# Patient Record
Sex: FEMALE | Race: BLACK OR AFRICAN AMERICAN | Employment: FULL TIME | URBAN - METROPOLITAN AREA
[De-identification: names, ages, dates, MRNs, and addresses within clinical notes are randomized per-mention and may not be internally consistent; named-entity substitution may affect disease eponyms.]

---

## 2017-06-28 ENCOUNTER — OFFICE VISIT (OUTPATIENT)
Dept: OBGYN CLINIC | Facility: CLINIC | Age: 37
End: 2017-06-28

## 2017-06-28 VITALS — WEIGHT: 220 LBS | BODY MASS INDEX: 33 KG/M2 | SYSTOLIC BLOOD PRESSURE: 120 MMHG | DIASTOLIC BLOOD PRESSURE: 68 MMHG

## 2017-06-28 DIAGNOSIS — R35.0 URINARY FREQUENCY: ICD-10-CM

## 2017-06-28 DIAGNOSIS — N89.8 VAGINAL ITCHING: Primary | ICD-10-CM

## 2017-06-28 PROCEDURE — 99213 OFFICE O/P EST LOW 20 MIN: CPT | Performed by: OBSTETRICS & GYNECOLOGY

## 2017-06-28 PROCEDURE — 87086 URINE CULTURE/COLONY COUNT: CPT | Performed by: OBSTETRICS & GYNECOLOGY

## 2017-06-28 PROCEDURE — 87106 FUNGI IDENTIFICATION YEAST: CPT | Performed by: OBSTETRICS & GYNECOLOGY

## 2017-06-28 PROCEDURE — 81001 URINALYSIS AUTO W/SCOPE: CPT | Performed by: OBSTETRICS & GYNECOLOGY

## 2017-06-28 PROCEDURE — 87808 TRICHOMONAS ASSAY W/OPTIC: CPT | Performed by: OBSTETRICS & GYNECOLOGY

## 2017-06-28 PROCEDURE — 87205 SMEAR GRAM STAIN: CPT | Performed by: OBSTETRICS & GYNECOLOGY

## 2017-06-28 PROCEDURE — 81002 URINALYSIS NONAUTO W/O SCOPE: CPT | Performed by: OBSTETRICS & GYNECOLOGY

## 2017-06-28 RX ORDER — SPIRONOLACTONE 25 MG/1
TABLET ORAL
COMMUNITY
Start: 2017-05-30 | End: 2018-05-04

## 2017-06-28 RX ORDER — DOXYCYCLINE 50 MG/1
TABLET ORAL
COMMUNITY
Start: 2017-05-30 | End: 2018-05-04

## 2017-06-28 NOTE — PROGRESS NOTES
Josefina Dickerson is a 42year old female. HPI:   Patient presents with:  Urinary Frequency  Vaginal Problem: Vaginal itching/burning       Started period today with concerns about urinary and vaginal irritative symptoms unrelieved by OTC Monistat.  Office

## 2017-06-29 ENCOUNTER — TELEPHONE (OUTPATIENT)
Dept: OBGYN CLINIC | Facility: CLINIC | Age: 37
End: 2017-06-29

## 2017-06-29 RX ORDER — CLINDAMYCIN HYDROCHLORIDE 300 MG/1
300 CAPSULE ORAL 2 TIMES DAILY
Qty: 14 CAPSULE | Refills: 0 | Status: SHIPPED | OUTPATIENT
Start: 2017-06-29 | End: 2017-07-06

## 2017-06-29 NOTE — TELEPHONE ENCOUNTER
Spoke with pt given results of  + BV on vaginal swab with yeas/ urine culture still pending. Pt aware I will call her once results available. Rx sent over for Clindamycin.

## 2017-06-29 NOTE — TELEPHONE ENCOUNTER
----- Message from Mattie Hernandez MD sent at 6/29/2017 11:31 AM CDT -----  Your lab results are positive for BV, yeast still pending    Please begin eRx for Clindamycin 300 mg twice daily x7d  We will notify you when yeast results return

## 2017-07-31 ENCOUNTER — TELEPHONE (OUTPATIENT)
Dept: OBGYN CLINIC | Facility: CLINIC | Age: 37
End: 2017-07-31

## 2017-07-31 NOTE — TELEPHONE ENCOUNTER
Pt c/o of vaginal infection, discharge, no odor, would like a med called in. Informed pt that we can not call a med in without seeing her first. Scheduled pt with Dr. Krissy Campa 8/1/17.

## 2017-07-31 NOTE — TELEPHONE ENCOUNTER
Patient complaining of vaginal infection with discharge and no odor. Patient would like medication call in.

## 2017-08-01 ENCOUNTER — OFFICE VISIT (OUTPATIENT)
Dept: OBGYN CLINIC | Facility: CLINIC | Age: 37
End: 2017-08-01

## 2017-08-01 VITALS — DIASTOLIC BLOOD PRESSURE: 76 MMHG | SYSTOLIC BLOOD PRESSURE: 114 MMHG

## 2017-08-01 DIAGNOSIS — N76.0 VAGINITIS AND VULVOVAGINITIS: ICD-10-CM

## 2017-08-01 DIAGNOSIS — N89.8 VAGINAL DISCHARGE: Primary | ICD-10-CM

## 2017-08-01 PROCEDURE — 87106 FUNGI IDENTIFICATION YEAST: CPT | Performed by: OBSTETRICS & GYNECOLOGY

## 2017-08-01 PROCEDURE — 87798 DETECT AGENT NOS DNA AMP: CPT | Performed by: OBSTETRICS & GYNECOLOGY

## 2017-08-01 PROCEDURE — 87205 SMEAR GRAM STAIN: CPT | Performed by: OBSTETRICS & GYNECOLOGY

## 2017-08-01 PROCEDURE — 87808 TRICHOMONAS ASSAY W/OPTIC: CPT | Performed by: OBSTETRICS & GYNECOLOGY

## 2017-08-01 PROCEDURE — G0452 MOLECULAR PATHOLOGY INTERPR: HCPCS | Performed by: OBSTETRICS & GYNECOLOGY

## 2017-08-01 PROCEDURE — 99213 OFFICE O/P EST LOW 20 MIN: CPT | Performed by: OBSTETRICS & GYNECOLOGY

## 2017-08-01 RX ORDER — INSULIN ASPART 100 [IU]/ML
INJECTION, SOLUTION INTRAVENOUS; SUBCUTANEOUS
COMMUNITY
Start: 2017-07-13

## 2017-08-01 RX ORDER — METRONIDAZOLE 500 MG/1
500 TABLET ORAL
Qty: 14 TABLET | Refills: 3 | Status: SHIPPED | OUTPATIENT
Start: 2017-08-01 | End: 2017-08-08

## 2017-08-01 RX ORDER — BLOOD SUGAR DIAGNOSTIC
STRIP MISCELLANEOUS
COMMUNITY
Start: 2017-07-19 | End: 2018-05-04

## 2017-08-01 NOTE — PROGRESS NOTES
Dwight Ruffin is for a checkup. She is complaining of foul-smelling vaginal discharge for past 1 week. Patient has had bacterial vaginosis previously. Patient is diabetic and she says that her diabetes is under poor control.   She is planning on seeing endocrin done. HPV screen not performed. Uterus: Retroverted and normal size. Shape: Normal.  No cervical motion tenderness  Adnexa: No masses or tenderness. Cul de sac: Normal.  R/V: Not performed. Small hemorrhoids.        ASSESSMENT/PLAN:           ASSESSMENT

## 2017-08-04 LAB
GENITAL VAGINOSIS SCREEN: NEGATIVE
HSV1 DNA SPEC QL NAA+PROBE: NEGATIVE
HSV2 DNA SPEC QL NAA+PROBE: POSITIVE
TRICHOMONAS SCREEN: NEGATIVE

## 2017-08-07 ENCOUNTER — TELEPHONE (OUTPATIENT)
Dept: OBGYN CLINIC | Facility: CLINIC | Age: 37
End: 2017-08-07

## 2017-08-07 PROBLEM — A60.00 GENITAL HERPES: Status: ACTIVE | Noted: 2017-08-07

## 2018-05-04 ENCOUNTER — OFFICE VISIT (OUTPATIENT)
Dept: OBGYN CLINIC | Facility: CLINIC | Age: 38
End: 2018-05-04

## 2018-05-04 VITALS — HEIGHT: 68 IN | SYSTOLIC BLOOD PRESSURE: 106 MMHG | DIASTOLIC BLOOD PRESSURE: 71 MMHG

## 2018-05-04 DIAGNOSIS — N76.0 VAGINITIS AND VULVOVAGINITIS: Primary | ICD-10-CM

## 2018-05-04 PROCEDURE — 87624 HPV HI-RISK TYP POOLED RSLT: CPT | Performed by: OBSTETRICS & GYNECOLOGY

## 2018-05-04 PROCEDURE — 99213 OFFICE O/P EST LOW 20 MIN: CPT | Performed by: OBSTETRICS & GYNECOLOGY

## 2018-05-04 PROCEDURE — 87205 SMEAR GRAM STAIN: CPT | Performed by: OBSTETRICS & GYNECOLOGY

## 2018-05-04 PROCEDURE — 87086 URINE CULTURE/COLONY COUNT: CPT | Performed by: OBSTETRICS & GYNECOLOGY

## 2018-05-04 PROCEDURE — 81001 URINALYSIS AUTO W/SCOPE: CPT | Performed by: OBSTETRICS & GYNECOLOGY

## 2018-05-04 PROCEDURE — 87106 FUNGI IDENTIFICATION YEAST: CPT | Performed by: OBSTETRICS & GYNECOLOGY

## 2018-05-04 PROCEDURE — 87808 TRICHOMONAS ASSAY W/OPTIC: CPT | Performed by: OBSTETRICS & GYNECOLOGY

## 2018-05-04 RX ORDER — INSULIN ASPART 100 [IU]/ML
INJECTION, SOLUTION INTRAVENOUS; SUBCUTANEOUS
COMMUNITY
Start: 2017-07-13 | End: 2020-01-27

## 2018-05-04 RX ORDER — HYDROXYCHLOROQUINE SULFATE 200 MG/1
200 TABLET, FILM COATED ORAL
COMMUNITY
Start: 2017-12-01 | End: 2018-05-04

## 2018-05-04 RX ORDER — FLUCONAZOLE 150 MG/1
150 TABLET ORAL DAILY
Qty: 3 TABLET | Refills: 4 | Status: SHIPPED | OUTPATIENT
Start: 2018-05-04 | End: 2018-05-07

## 2018-05-04 NOTE — PROGRESS NOTES
dAam Mendosa is here for a checkup. She is complaining of urgency in urination. Also she is complaining of vaginal discharge and itching. Patient has uncontrolled diabetes and she tells me that her sugars can range anywhere from 300-400.   She has seen an endo C&S         ORDERS:     No orders of the defined types were placed in this encounter.       PRESCRIPTIONS:       No prescriptions requested or ordered in this encounter       IMAGING/ REFERRALS:      None     (N76.0) Vaginitis and vulvovaginitis  (primary e

## 2018-05-06 ENCOUNTER — TELEPHONE (OUTPATIENT)
Dept: OBGYN CLINIC | Facility: CLINIC | Age: 38
End: 2018-05-06

## 2018-05-06 RX ORDER — METRONIDAZOLE 500 MG/1
500 TABLET ORAL 2 TIMES DAILY WITH MEALS
Qty: 14 TABLET | Refills: 0 | Status: SHIPPED | OUTPATIENT
Start: 2018-05-06 | End: 2018-05-13

## 2018-10-04 ENCOUNTER — OFFICE VISIT (OUTPATIENT)
Dept: OBGYN CLINIC | Facility: CLINIC | Age: 38
End: 2018-10-04
Payer: COMMERCIAL

## 2018-10-04 VITALS — BODY MASS INDEX: 33 KG/M2 | SYSTOLIC BLOOD PRESSURE: 110 MMHG | HEIGHT: 68 IN | DIASTOLIC BLOOD PRESSURE: 60 MMHG

## 2018-10-04 DIAGNOSIS — N89.8 VAGINAL IRRITATION: Primary | ICD-10-CM

## 2018-10-04 PROBLEM — E11.9 DIABETES (HCC): Status: ACTIVE | Noted: 2018-10-04

## 2018-10-04 PROBLEM — K80.20 CHOLELITHIASIS: Status: ACTIVE | Noted: 2018-10-04

## 2018-10-04 PROBLEM — N12 PYELONEPHRITIS: Status: ACTIVE | Noted: 2018-10-04

## 2018-10-04 PROBLEM — Z30.8 ENCOUNTER FOR POST ESSURE STERILIZATION CHECK: Status: ACTIVE | Noted: 2018-10-04

## 2018-10-04 PROCEDURE — 87086 URINE CULTURE/COLONY COUNT: CPT | Performed by: OBSTETRICS & GYNECOLOGY

## 2018-10-04 PROCEDURE — 87205 SMEAR GRAM STAIN: CPT | Performed by: OBSTETRICS & GYNECOLOGY

## 2018-10-04 PROCEDURE — 99213 OFFICE O/P EST LOW 20 MIN: CPT | Performed by: OBSTETRICS & GYNECOLOGY

## 2018-10-04 PROCEDURE — 81002 URINALYSIS NONAUTO W/O SCOPE: CPT | Performed by: OBSTETRICS & GYNECOLOGY

## 2018-10-04 PROCEDURE — 87106 FUNGI IDENTIFICATION YEAST: CPT | Performed by: OBSTETRICS & GYNECOLOGY

## 2018-10-04 PROCEDURE — 87808 TRICHOMONAS ASSAY W/OPTIC: CPT | Performed by: OBSTETRICS & GYNECOLOGY

## 2018-10-04 RX ORDER — BENZONATATE 200 MG/1
CAPSULE ORAL
Refills: 0 | COMMUNITY
Start: 2018-09-07 | End: 2019-01-09

## 2018-10-04 RX ORDER — ACETAMINOPHEN AND CODEINE PHOSPHATE 300; 30 MG/1; MG/1
1-2 TABLET ORAL
COMMUNITY
Start: 2018-09-07 | End: 2019-01-09

## 2018-10-04 RX ORDER — METRONIDAZOLE 500 MG/1
500 TABLET ORAL
Qty: 14 TABLET | Refills: 1 | Status: SHIPPED | OUTPATIENT
Start: 2018-10-04 | End: 2018-10-11

## 2018-10-04 NOTE — PROGRESS NOTES
Yane Howe is here for a checkup. She is complaining of vaginal discharge and itching. Patient has diabetes that is not under control. She was admitted to [facility] first week of August and she was in the hospital for about 5 days with pyelonephritis. Shape: Normal.  Adnexa: No masses or tenderness. Cul de sac: Normal.  R/V: Not performed. Small hemorrhoids. ASSESSMENT/PLAN:   Assessment     1. Vaginal irritation         ASSESSMENT:      Clinically bacterial vaginosis.   Since patient has uncontr

## 2018-10-07 ENCOUNTER — TELEPHONE (OUTPATIENT)
Dept: OBGYN CLINIC | Facility: CLINIC | Age: 38
End: 2018-10-07

## 2018-10-07 RX ORDER — FLUCONAZOLE 150 MG/1
150 TABLET ORAL DAILY
Qty: 2 TABLET | Refills: 3 | Status: SHIPPED | OUTPATIENT
Start: 2018-10-07 | End: 2018-10-09

## 2018-10-07 NOTE — TELEPHONE ENCOUNTER
I called patient today to let her know that her Candida culture is positive. I want to order Diflucan 150 mg daily for 2 days for her.

## 2019-01-09 ENCOUNTER — OFFICE VISIT (OUTPATIENT)
Dept: OBGYN CLINIC | Facility: CLINIC | Age: 39
End: 2019-01-09
Payer: COMMERCIAL

## 2019-01-09 VITALS — HEIGHT: 68 IN | SYSTOLIC BLOOD PRESSURE: 118 MMHG | DIASTOLIC BLOOD PRESSURE: 68 MMHG | BODY MASS INDEX: 33 KG/M2

## 2019-01-09 DIAGNOSIS — N76.0 VAGINITIS AND VULVOVAGINITIS: Primary | ICD-10-CM

## 2019-01-09 PROCEDURE — 87106 FUNGI IDENTIFICATION YEAST: CPT | Performed by: OBSTETRICS & GYNECOLOGY

## 2019-01-09 PROCEDURE — 87591 N.GONORRHOEAE DNA AMP PROB: CPT | Performed by: OBSTETRICS & GYNECOLOGY

## 2019-01-09 PROCEDURE — 99213 OFFICE O/P EST LOW 20 MIN: CPT | Performed by: OBSTETRICS & GYNECOLOGY

## 2019-01-09 PROCEDURE — 87808 TRICHOMONAS ASSAY W/OPTIC: CPT | Performed by: OBSTETRICS & GYNECOLOGY

## 2019-01-09 PROCEDURE — 87491 CHLMYD TRACH DNA AMP PROBE: CPT | Performed by: OBSTETRICS & GYNECOLOGY

## 2019-01-09 PROCEDURE — 87205 SMEAR GRAM STAIN: CPT | Performed by: OBSTETRICS & GYNECOLOGY

## 2019-01-09 RX ORDER — FLUCONAZOLE 150 MG/1
150 TABLET ORAL DAILY
Qty: 2 TABLET | Refills: 3 | Status: SHIPPED | OUTPATIENT
Start: 2019-01-09 | End: 2019-01-11

## 2019-01-09 NOTE — PROGRESS NOTES
Rosaura Paris is here for a checkup. She is complaining of vulvar itching and vaginal discharge. She has been symptomatic for about 3 days. Patient has history of uncontrolled diabetes. She says her last hemoglobin A1c was 10.   She is currently is seeing en

## 2019-01-10 LAB
C TRACH DNA SPEC QL NAA+PROBE: NEGATIVE
N GONORRHOEA DNA SPEC QL NAA+PROBE: NEGATIVE

## 2019-01-10 RX ORDER — METRONIDAZOLE 500 MG/1
500 TABLET ORAL 2 TIMES DAILY
Qty: 14 TABLET | Refills: 1 | Status: SHIPPED | OUTPATIENT
Start: 2019-01-10 | End: 2020-01-07

## 2019-01-12 LAB
GENITAL VAGINOSIS SCREEN: POSITIVE
TRICHOMONAS SCREEN: NEGATIVE

## 2019-01-14 ENCOUNTER — TELEPHONE (OUTPATIENT)
Dept: OBGYN CLINIC | Facility: CLINIC | Age: 39
End: 2019-01-14

## 2019-01-14 NOTE — TELEPHONE ENCOUNTER
----- Message from Ulysses Potters, MD sent at 1/14/2019  7:43 AM CST -----  Results reviewed. Bacterial vaginosis culture as well as Candida is positive.   Treat bacterial vaginosis with metronidazole 500 mg twice daily for 7 days and Candida with Terazol

## 2019-09-17 NOTE — TELEPHONE ENCOUNTER
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 She feels that the area has healed. She has a h/o HSV as teenager with no recurrences since. Her  also is known to have HSV.  I dw that we will sometimes use daily suppression with medica none

## 2020-01-06 ENCOUNTER — TELEPHONE (OUTPATIENT)
Dept: OBGYN CLINIC | Facility: CLINIC | Age: 40
End: 2020-01-06

## 2020-01-06 NOTE — TELEPHONE ENCOUNTER
Pt c/o vaginal discharge about a week ago. Pt c/o \"horrid smell\", irritation, and thick white discharge. Pt states last week the discharge was more yellow. Tea tree oil suppository thinks it made it worse. [facility] with [facility] 1[facility]9/20109.   [facility]'t h

## 2020-01-07 ENCOUNTER — OFFICE VISIT (OUTPATIENT)
Dept: OBGYN CLINIC | Facility: CLINIC | Age: 40
End: 2020-01-07
Payer: COMMERCIAL

## 2020-01-07 VITALS
SYSTOLIC BLOOD PRESSURE: 110 MMHG | WEIGHT: 235 LBS | HEIGHT: 68 IN | DIASTOLIC BLOOD PRESSURE: 70 MMHG | BODY MASS INDEX: 35.61 KG/M2

## 2020-01-07 DIAGNOSIS — N76.0 RECURRENT VAGINITIS: ICD-10-CM

## 2020-01-07 DIAGNOSIS — N89.8 VAGINAL DISCHARGE: Primary | ICD-10-CM

## 2020-01-07 PROCEDURE — 87808 TRICHOMONAS ASSAY W/OPTIC: CPT | Performed by: OBSTETRICS & GYNECOLOGY

## 2020-01-07 PROCEDURE — 87205 SMEAR GRAM STAIN: CPT | Performed by: OBSTETRICS & GYNECOLOGY

## 2020-01-07 PROCEDURE — 87106 FUNGI IDENTIFICATION YEAST: CPT | Performed by: OBSTETRICS & GYNECOLOGY

## 2020-01-07 PROCEDURE — 99214 OFFICE O/P EST MOD 30 MIN: CPT | Performed by: OBSTETRICS & GYNECOLOGY

## 2020-01-07 RX ORDER — BLOOD-GLUCOSE SENSOR
1 EACH MISCELLANEOUS
COMMUNITY
Start: 2019-11-05

## 2020-01-07 RX ORDER — FLUCONAZOLE 150 MG/1
150 TABLET ORAL WEEKLY
Qty: 26 TABLET | Refills: 0 | Status: SHIPPED | OUTPATIENT
Start: 2020-01-14 | End: 2020-01-27

## 2020-01-07 RX ORDER — HYDROXYCHLOROQUINE SULFATE 200 MG/1
200 TABLET, FILM COATED ORAL
COMMUNITY
Start: 2019-10-09 | End: 2020-10-23

## 2020-01-07 RX ORDER — FLUCONAZOLE 150 MG/1
150 TABLET ORAL
Qty: 3 TABLET | Refills: 0 | Status: SHIPPED | OUTPATIENT
Start: 2020-01-07 | End: 2020-01-14

## 2020-01-07 NOTE — PROGRESS NOTES
[address] [facility]  Obstetrics and Gynecology  Follow Up    Subjective:     Jose Flores is a 37year old  female who presents with c/o recurrent vaginal discharge and yeast infections.  The patient reports in the last year lesions, good perineal support  Urethra: meatus normal   Bladder: well supported  Vagina: erythematous mucosa, no lesions, minimal thin white vaginal discharge without odor, vaginitis swab collected   Uterus: normal size, mobile, nontender  Cervix: normal

## 2020-01-09 LAB
GENITAL VAGINOSIS SCREEN: POSITIVE
TRICHOMONAS SCREEN: NEGATIVE

## 2020-01-09 NOTE — PROGRESS NOTES
Pt notified of lab results and medication. Pt verbalized understanding and agrees with plan of care.

## 2020-01-10 ENCOUNTER — TELEPHONE (OUTPATIENT)
Dept: OBGYN CLINIC | Facility: CLINIC | Age: 40
End: 2020-01-10

## 2020-01-10 RX ORDER — METRONIDAZOLE 7.5 MG/G
1 GEL VAGINAL NIGHTLY
Qty: 1 TUBE | Refills: 0 | Status: SHIPPED | OUTPATIENT
Start: 2020-01-10 | End: 2020-01-27

## 2020-01-10 NOTE — TELEPHONE ENCOUNTER
Per consult with the pharmacy pt picked up her diflucan on 1/7/2020 and her next RX will be available on 1/14/2020 as prescribed. LMTCB for pt.

## 2020-01-10 NOTE — TELEPHONE ENCOUNTER
fluconazole (DIFLUCAN) 150 MG Oral Tab 26 tablet 0 1/14/2020 7/14/2020    Sig - Route: Take 1 tablet (150 mg total) by mouth once a week.  Take 1 tablet by mouth weekly for 6 months. - Oral    Sent to pharmacy as: Fluconazole 150 MG Oral Tablet (DIFLUCA

## 2020-01-23 ENCOUNTER — TELEPHONE (OUTPATIENT)
Dept: OBGYN CLINIC | Facility: CLINIC | Age: 40
End: 2020-01-23

## 2020-01-23 ENCOUNTER — NURSE ONLY (OUTPATIENT)
Dept: OBGYN CLINIC | Facility: CLINIC | Age: 40
End: 2020-01-23
Payer: COMMERCIAL

## 2020-01-23 DIAGNOSIS — R39.9 UTI SYMPTOMS: Primary | ICD-10-CM

## 2020-01-23 LAB
BILIRUBIN: NEGATIVE
GLUCOSE (URINE DIPSTICK): 500 MG/DL
MULTISTIX LOT#: NORMAL NUMERIC
NITRITE, URINE: POSITIVE
PH, URINE: 6.5 (ref 4.5–8)
SPECIFIC GRAVITY: 1.02 (ref 1–1.03)
URINE-COLOR: YELLOW
UROBILINOGEN,SEMI-QN: 0.2 MG/DL (ref 0–1.9)

## 2020-01-23 PROCEDURE — 81002 URINALYSIS NONAUTO W/O SCOPE: CPT | Performed by: OBSTETRICS & GYNECOLOGY

## 2020-01-23 PROCEDURE — 87088 URINE BACTERIA CULTURE: CPT | Performed by: OBSTETRICS & GYNECOLOGY

## 2020-01-23 PROCEDURE — 87086 URINE CULTURE/COLONY COUNT: CPT | Performed by: OBSTETRICS & GYNECOLOGY

## 2020-01-23 PROCEDURE — 87186 SC STD MICRODIL/AGAR DIL: CPT | Performed by: OBSTETRICS & GYNECOLOGY

## 2020-01-23 RX ORDER — SULFAMETHOXAZOLE AND TRIMETHOPRIM 800; 160 MG/1; MG/1
1 TABLET ORAL 2 TIMES DAILY
Qty: 6 TABLET | Refills: 0 | Status: SHIPPED | OUTPATIENT
Start: 2020-01-23 | End: 2020-01-26

## 2020-01-23 NOTE — TELEPHONE ENCOUNTER
Pt c/o urgency, frequency, difficulty holding urine and pain with urination. Pt denies hematuria.     Pt scheduled for RN visit for urine dip today

## 2020-01-23 NOTE — PROGRESS NOTES
Pt here for NV for poss UTI. Pt c/o frequent urination and discomfort. Pt states symptoms of UTI x 3 days. In office UA dip recorded in Epic. Pt NKDA. Per Dr Yahir Love treat pt with Bactrim DS BID x 3days. eRx for Bactrim sent to pharmacy.

## 2020-01-24 NOTE — PROGRESS NOTES
Spoke with pt results of + urine culture given. Pt aware to continue taking Bactrim DS. Pt voiced understanding and agrees with POC.

## 2020-01-27 ENCOUNTER — OFFICE VISIT (OUTPATIENT)
Dept: OBGYN CLINIC | Facility: CLINIC | Age: 40
End: 2020-01-27
Payer: COMMERCIAL

## 2020-01-27 VITALS
HEIGHT: 68 IN | WEIGHT: 236 LBS | DIASTOLIC BLOOD PRESSURE: 60 MMHG | BODY MASS INDEX: 35.77 KG/M2 | SYSTOLIC BLOOD PRESSURE: 114 MMHG

## 2020-01-27 DIAGNOSIS — N76.0 VAGINITIS AND VULVOVAGINITIS: ICD-10-CM

## 2020-01-27 DIAGNOSIS — Z01.419 WOMEN'S ANNUAL ROUTINE GYNECOLOGICAL EXAMINATION: Primary | ICD-10-CM

## 2020-01-27 DIAGNOSIS — L68.0 HIRSUTISM: ICD-10-CM

## 2020-01-27 PROBLEM — E78.5 HYPERLIPIDEMIA: Status: ACTIVE | Noted: 2018-08-23

## 2020-01-27 PROCEDURE — 87205 SMEAR GRAM STAIN: CPT | Performed by: OBSTETRICS & GYNECOLOGY

## 2020-01-27 PROCEDURE — 87591 N.GONORRHOEAE DNA AMP PROB: CPT | Performed by: OBSTETRICS & GYNECOLOGY

## 2020-01-27 PROCEDURE — 87106 FUNGI IDENTIFICATION YEAST: CPT | Performed by: OBSTETRICS & GYNECOLOGY

## 2020-01-27 PROCEDURE — 87624 HPV HI-RISK TYP POOLED RSLT: CPT | Performed by: OBSTETRICS & GYNECOLOGY

## 2020-01-27 PROCEDURE — 87808 TRICHOMONAS ASSAY W/OPTIC: CPT | Performed by: OBSTETRICS & GYNECOLOGY

## 2020-01-27 PROCEDURE — 99396 PREV VISIT EST AGE 40-64: CPT | Performed by: OBSTETRICS & GYNECOLOGY

## 2020-01-27 PROCEDURE — 87491 CHLMYD TRACH DNA AMP PROBE: CPT | Performed by: OBSTETRICS & GYNECOLOGY

## 2020-01-27 RX ORDER — FLUCONAZOLE 150 MG/1
150 TABLET ORAL DAILY
Qty: 3 TABLET | Refills: 3 | Status: SHIPPED | OUTPATIENT
Start: 2020-01-27 | End: 2020-01-30

## 2020-01-27 NOTE — PROGRESS NOTES
Sophie Castro is here for a checkup. Patient is complaining of slight vaginal discharge. Patient was seen previously about 2 3 weeks ago for same complaints. Sophie Castro currently has insulin pump and she tells me that her recent hemoglobin A1c was 11.   She says Application by Does not apply route.      • NOVOLOG 100 UNIT/ML Subcutaneous Solution          Family History     Family History   Problem Relation Age of Onset   • High Blood Pressure Father    • Blood Disorder Mother         ITP   • High Blood Pressure Mo Concern: Not Asked        Stress Concern: Not Asked        Weight Concern: Not Asked        Special Diet: Not Asked        Back Care: Not Asked        Exercise: Not Asked        Bike Helmet: Not Asked        Seat Belt: Not Asked        Self-Exams: Not Aske

## 2020-01-28 LAB
C TRACH DNA SPEC QL NAA+PROBE: NEGATIVE
HPV I/H RISK 1 DNA SPEC QL NAA+PROBE: NEGATIVE
N GONORRHOEA DNA SPEC QL NAA+PROBE: NEGATIVE

## 2020-01-29 LAB
GENITAL VAGINOSIS SCREEN: NEGATIVE
TRICHOMONAS SCREEN: NEGATIVE

## 2020-04-17 ENCOUNTER — TELEPHONE (OUTPATIENT)
Dept: OBGYN CLINIC | Facility: CLINIC | Age: 40
End: 2020-04-17

## 2020-04-17 RX ORDER — SULFAMETHOXAZOLE AND TRIMETHOPRIM 800; 160 MG/1; MG/1
1 TABLET ORAL 2 TIMES DAILY
Qty: 6 TABLET | Refills: 0 | Status: SHIPPED | OUTPATIENT
Start: 2020-04-17 | End: 2020-04-20

## 2020-04-17 NOTE — TELEPHONE ENCOUNTER
Called pt and c/o UTI, same as before, frequent urination, pressure on the bladder denies burning. Provided med for UTI. Pt verbalized understanding.

## 2020-08-20 ENCOUNTER — OFFICE VISIT (OUTPATIENT)
Dept: OBGYN CLINIC | Facility: CLINIC | Age: 40
End: 2020-08-20
Payer: COMMERCIAL

## 2020-08-20 VITALS
WEIGHT: 236 LBS | HEIGHT: 68 IN | SYSTOLIC BLOOD PRESSURE: 126 MMHG | BODY MASS INDEX: 35.77 KG/M2 | DIASTOLIC BLOOD PRESSURE: 82 MMHG

## 2020-08-20 DIAGNOSIS — Z11.3 SCREENING EXAMINATION FOR VENEREAL DISEASE: ICD-10-CM

## 2020-08-20 DIAGNOSIS — N89.8 VAGINAL DISCHARGE: Primary | ICD-10-CM

## 2020-08-20 PROCEDURE — 3074F SYST BP LT 130 MM HG: CPT | Performed by: OBSTETRICS & GYNECOLOGY

## 2020-08-20 PROCEDURE — 87491 CHLMYD TRACH DNA AMP PROBE: CPT | Performed by: OBSTETRICS & GYNECOLOGY

## 2020-08-20 PROCEDURE — 87808 TRICHOMONAS ASSAY W/OPTIC: CPT | Performed by: OBSTETRICS & GYNECOLOGY

## 2020-08-20 PROCEDURE — 3079F DIAST BP 80-89 MM HG: CPT | Performed by: OBSTETRICS & GYNECOLOGY

## 2020-08-20 PROCEDURE — 99213 OFFICE O/P EST LOW 20 MIN: CPT | Performed by: OBSTETRICS & GYNECOLOGY

## 2020-08-20 PROCEDURE — 87106 FUNGI IDENTIFICATION YEAST: CPT | Performed by: OBSTETRICS & GYNECOLOGY

## 2020-08-20 PROCEDURE — 87591 N.GONORRHOEAE DNA AMP PROB: CPT | Performed by: OBSTETRICS & GYNECOLOGY

## 2020-08-20 PROCEDURE — 87205 SMEAR GRAM STAIN: CPT | Performed by: OBSTETRICS & GYNECOLOGY

## 2020-08-20 PROCEDURE — 3008F BODY MASS INDEX DOCD: CPT | Performed by: OBSTETRICS & GYNECOLOGY

## 2020-08-20 RX ORDER — FLUCONAZOLE 150 MG/1
150 TABLET ORAL ONCE
Qty: 2 TABLET | Refills: 0 | Status: SHIPPED | OUTPATIENT
Start: 2020-08-20 | End: 2020-08-20

## 2020-08-20 RX ORDER — METRONIDAZOLE 7.5 MG/G
1 GEL VAGINAL NIGHTLY
Qty: 1 TUBE | Refills: 0 | Status: SHIPPED | OUTPATIENT
Start: 2020-08-20 | End: 2020-08-25

## 2020-08-20 NOTE — PROGRESS NOTES
[address] [facility]  Obstetrics and Gynecology  Follow Up    Subjective:     Alison Henao is a 41year old  female who presents with c/o vaginal discharge for about 1 week. Pt noted she feels like a BV infection.  She noted a like a popped pimple.    EXTREMITIES:  Normal range of motion, strength 5/5, nontender without edema      Assessment:     Liset Carrier is a 41year old  female who presents for vaginal discharge and new skin lesion    Patient Active Problem List:

## 2020-08-21 LAB
C TRACH DNA SPEC QL NAA+PROBE: NEGATIVE
N GONORRHOEA DNA SPEC QL NAA+PROBE: NEGATIVE

## 2020-08-22 LAB
GENITAL VAGINOSIS SCREEN: NEGATIVE
TRICHOMONAS SCREEN: NEGATIVE

## 2022-08-01 ENCOUNTER — HOSPITAL ENCOUNTER (EMERGENCY)
Facility: HOSPITAL | Age: 42
Discharge: HOME | End: 2022-08-01
Payer: COMMERCIAL

## 2022-08-01 VITALS
HEART RATE: 84 BPM | OXYGEN SATURATION: 99 % | DIASTOLIC BLOOD PRESSURE: 74 MMHG | WEIGHT: 240 LBS | BODY MASS INDEX: 37.67 KG/M2 | HEIGHT: 67 IN | TEMPERATURE: 99 F | SYSTOLIC BLOOD PRESSURE: 130 MMHG | RESPIRATION RATE: 16 BRPM

## 2022-08-01 DIAGNOSIS — N61.1 BREAST ABSCESS: Primary | ICD-10-CM

## 2022-08-01 LAB
ANION GAP SERPL CALC-SCNC: 6 MMOL/L (ref 0–18)
BASOPHILS # BLD AUTO: 0.06 X10(3) UL (ref 0–0.2)
BASOPHILS NFR BLD AUTO: 0.4 %
BUN BLD-MCNC: 12 MG/DL (ref 7–18)
BUN/CREAT SERPL: 15.4 (ref 10–20)
CALCIUM BLD-MCNC: 9.2 MG/DL (ref 8.5–10.1)
CHLORIDE SERPL-SCNC: 102 MMOL/L (ref 98–112)
CO2 SERPL-SCNC: 26 MMOL/L (ref 21–32)
CREAT BLD-MCNC: 0.78 MG/DL
DEPRECATED RDW RBC AUTO: 40.2 FL (ref 35.1–46.3)
EOSINOPHIL # BLD AUTO: 0.25 X10(3) UL (ref 0–0.7)
EOSINOPHIL NFR BLD AUTO: 1.9 %
ERYTHROCYTE [DISTWIDTH] IN BLOOD BY AUTOMATED COUNT: 13.1 % (ref 11–15)
GLUCOSE BLD-MCNC: 243 MG/DL (ref 70–99)
HCT VFR BLD AUTO: 38.4 %
HGB BLD-MCNC: 12.6 G/DL
IMM GRANULOCYTES # BLD AUTO: 0.04 X10(3) UL (ref 0–1)
IMM GRANULOCYTES NFR BLD: 0.3 %
LYMPHOCYTES # BLD AUTO: 2.1 X10(3) UL (ref 1–4)
LYMPHOCYTES NFR BLD AUTO: 15.7 %
MCH RBC QN AUTO: 27.7 PG (ref 26–34)
MCHC RBC AUTO-ENTMCNC: 32.8 G/DL (ref 31–37)
MCV RBC AUTO: 84.4 FL
MONOCYTES # BLD AUTO: 1.89 X10(3) UL (ref 0.1–1)
MONOCYTES NFR BLD AUTO: 14.2 %
NEUTROPHILS # BLD AUTO: 9 X10 (3) UL (ref 1.5–7.7)
NEUTROPHILS # BLD AUTO: 9 X10(3) UL (ref 1.5–7.7)
NEUTROPHILS NFR BLD AUTO: 67.5 %
OSMOLALITY SERPL CALC.SUM OF ELEC: 286 MOSM/KG (ref 275–295)
PLATELET # BLD AUTO: 279 10(3)UL (ref 150–450)
POTASSIUM SERPL-SCNC: 4.2 MMOL/L (ref 3.5–5.1)
RBC # BLD AUTO: 4.55 X10(6)UL
SODIUM SERPL-SCNC: 134 MMOL/L (ref 136–145)
WBC # BLD AUTO: 13.3 X10(3) UL (ref 4–11)

## 2022-08-01 PROCEDURE — 99283 EMERGENCY DEPT VISIT LOW MDM: CPT

## 2022-08-01 PROCEDURE — 10160 PNXR ASPIR ABSC HMTMA BULLA: CPT

## 2022-08-01 PROCEDURE — 80048 BASIC METABOLIC PNL TOTAL CA: CPT | Performed by: NURSE PRACTITIONER

## 2022-08-01 PROCEDURE — 87186 SC STD MICRODIL/AGAR DIL: CPT | Performed by: NURSE PRACTITIONER

## 2022-08-01 PROCEDURE — 87205 SMEAR GRAM STAIN: CPT | Performed by: NURSE PRACTITIONER

## 2022-08-01 PROCEDURE — 87077 CULTURE AEROBIC IDENTIFY: CPT | Performed by: NURSE PRACTITIONER

## 2022-08-01 PROCEDURE — 36415 COLL VENOUS BLD VENIPUNCTURE: CPT

## 2022-08-01 PROCEDURE — 85025 COMPLETE CBC W/AUTO DIFF WBC: CPT | Performed by: NURSE PRACTITIONER

## 2022-08-01 PROCEDURE — 87070 CULTURE OTHR SPECIMN AEROBIC: CPT | Performed by: NURSE PRACTITIONER

## 2022-08-01 RX ORDER — HYDROCODONE BITARTRATE AND ACETAMINOPHEN 5; 325 MG/1; MG/1
1-2 TABLET ORAL EVERY 6 HOURS PRN
Qty: 9 TABLET | Refills: 0 | Status: SHIPPED | OUTPATIENT
Start: 2022-08-01 | End: 2022-08-06

## 2022-08-01 RX ORDER — LIDOCAINE HYDROCHLORIDE AND EPINEPHRINE 20; 5 MG/ML; UG/ML
20 INJECTION, SOLUTION EPIDURAL; INFILTRATION; INTRACAUDAL; PERINEURAL ONCE
Status: COMPLETED | OUTPATIENT
Start: 2022-08-01 | End: 2022-08-01

## 2022-08-01 RX ORDER — HYDROCODONE BITARTRATE AND ACETAMINOPHEN 5; 325 MG/1; MG/1
1 TABLET ORAL ONCE
Status: COMPLETED | OUTPATIENT
Start: 2022-08-01 | End: 2022-08-01

## 2022-08-01 NOTE — ED INITIAL ASSESSMENT (HPI)
Pt ambulatory to ED /w c/o cyst to right breast. Recently on abx, symptoms getting worse. Pt is axox4.

## 2022-12-05 ENCOUNTER — HOSPITAL ENCOUNTER (INPATIENT)
Facility: HOSPITAL | Age: 42
LOS: 1 days | Discharge: HOME | End: 2022-12-06
Attending: EMERGENCY MEDICINE | Admitting: HOSPITALIST
Payer: COMMERCIAL

## 2022-12-05 DIAGNOSIS — R11.2 NAUSEA AND VOMITING IN ADULT: ICD-10-CM

## 2022-12-05 DIAGNOSIS — E10.10 TYPE 1 DIABETES MELLITUS WITH KETOACIDOSIS WITHOUT COMA (HCC): Primary | ICD-10-CM

## 2022-12-05 LAB
ALBUMIN SERPL-MCNC: 4 G/DL (ref 3.4–5)
ALBUMIN/GLOB SERPL: 0.8 {RATIO} (ref 1–2)
ALP LIVER SERPL-CCNC: 112 U/L
ALT SERPL-CCNC: 24 U/L
ANION GAP SERPL CALC-SCNC: 13 MMOL/L (ref 0–18)
ANION GAP SERPL CALC-SCNC: 27 MMOL/L (ref 0–18)
AST SERPL-CCNC: 23 U/L (ref 15–37)
BASE EXCESS BLD CALC-SCNC: -6.5 MMOL/L (ref ?–2)
BASOPHILS # BLD AUTO: 0.05 X10(3) UL (ref 0–0.2)
BASOPHILS NFR BLD AUTO: 0.3 %
BILIRUB SERPL-MCNC: 0.8 MG/DL (ref 0.1–2)
BILIRUB UR QL: NEGATIVE
BUN BLD-MCNC: 28 MG/DL (ref 7–18)
BUN BLD-MCNC: 36 MG/DL (ref 7–18)
BUN/CREAT SERPL: 22 (ref 10–20)
BUN/CREAT SERPL: 22.4 (ref 10–20)
CALCIUM BLD-MCNC: 10.2 MG/DL (ref 8.5–10.1)
CALCIUM BLD-MCNC: 8.8 MG/DL (ref 8.5–10.1)
CHLORIDE SERPL-SCNC: 100 MMOL/L (ref 98–112)
CHLORIDE SERPL-SCNC: 88 MMOL/L (ref 98–112)
CLARITY UR: CLEAR
CO2 SERPL-SCNC: 12 MMOL/L (ref 21–32)
CO2 SERPL-SCNC: 21 MMOL/L (ref 21–32)
COLOR UR: YELLOW
CREAT BLD-MCNC: 1.27 MG/DL
CREAT BLD-MCNC: 1.61 MG/DL
DEPRECATED RDW RBC AUTO: 39.8 FL (ref 35.1–46.3)
EOSINOPHIL # BLD AUTO: 0 X10(3) UL (ref 0–0.7)
EOSINOPHIL NFR BLD AUTO: 0 %
ERYTHROCYTE [DISTWIDTH] IN BLOOD BY AUTOMATED COUNT: 12.8 % (ref 11–15)
GFR SERPLBLD BASED ON 1.73 SQ M-ARVRAT: 40 ML/MIN/1.73M2 (ref 60–?)
GFR SERPLBLD BASED ON 1.73 SQ M-ARVRAT: 53 ML/MIN/1.73M2 (ref 60–?)
GLOBULIN PLAS-MCNC: 4.8 G/DL (ref 2.8–4.4)
GLUCOSE BLD-MCNC: 204 MG/DL (ref 70–99)
GLUCOSE BLD-MCNC: 544 MG/DL (ref 70–99)
GLUCOSE BLDC GLUCOMTR-MCNC: 170 MG/DL (ref 70–99)
GLUCOSE BLDC GLUCOMTR-MCNC: 197 MG/DL (ref 70–99)
GLUCOSE BLDC GLUCOMTR-MCNC: 217 MG/DL (ref 70–99)
GLUCOSE BLDC GLUCOMTR-MCNC: 249 MG/DL (ref 70–99)
GLUCOSE BLDC GLUCOMTR-MCNC: 266 MG/DL (ref 70–99)
GLUCOSE BLDC GLUCOMTR-MCNC: 315 MG/DL (ref 70–99)
GLUCOSE BLDC GLUCOMTR-MCNC: 445 MG/DL (ref 70–99)
GLUCOSE BLDC GLUCOMTR-MCNC: 508 MG/DL (ref 70–99)
GLUCOSE BLDC GLUCOMTR-MCNC: 531 MG/DL (ref 70–99)
GLUCOSE UR-MCNC: 500 MG/DL
HCO3 BLDV-SCNC: 18.2 MEQ/L (ref 22–26)
HCT VFR BLD AUTO: 43.7 %
HGB BLD-MCNC: 14.2 G/DL
HGB UR QL STRIP.AUTO: NEGATIVE
IMM GRANULOCYTES # BLD AUTO: 0.08 X10(3) UL (ref 0–1)
IMM GRANULOCYTES NFR BLD: 0.5 %
KETONES UR-MCNC: >=160 MG/DL
LEUKOCYTE ESTERASE UR QL STRIP.AUTO: NEGATIVE
LIPASE SERPL-CCNC: 46 U/L (ref 73–393)
LYMPHOCYTES # BLD AUTO: 0.96 X10(3) UL (ref 1–4)
LYMPHOCYTES NFR BLD AUTO: 6.1 %
MCH RBC QN AUTO: 27.5 PG (ref 26–34)
MCHC RBC AUTO-ENTMCNC: 32.5 G/DL (ref 31–37)
MCV RBC AUTO: 84.7 FL
MONOCYTES # BLD AUTO: 0.84 X10(3) UL (ref 0.1–1)
MONOCYTES NFR BLD AUTO: 5.3 %
NEUTROPHILS # BLD AUTO: 13.91 X10 (3) UL (ref 1.5–7.7)
NEUTROPHILS # BLD AUTO: 13.91 X10(3) UL (ref 1.5–7.7)
NEUTROPHILS NFR BLD AUTO: 87.8 %
NITRITE UR QL STRIP.AUTO: NEGATIVE
OSMOLALITY SERPL CALC.SUM OF ELEC: 289 MOSM/KG (ref 275–295)
OSMOLALITY SERPL CALC.SUM OF ELEC: 297 MOSM/KG (ref 275–295)
PCO2 BLDV: 41 MM HG (ref 38–50)
PH BLDV: 7.29 [PH] (ref 7.32–7.43)
PH UR: 5 [PH] (ref 5–8)
PLATELET # BLD AUTO: 262 10(3)UL (ref 150–450)
PO2 BLDV: 25 MM HG (ref 35–40)
POTASSIUM SERPL-SCNC: 4.3 MMOL/L (ref 3.5–5.1)
POTASSIUM SERPL-SCNC: 5.3 MMOL/L (ref 3.5–5.1)
PROT SERPL-MCNC: 8.8 G/DL (ref 6.4–8.2)
PROT UR-MCNC: NEGATIVE MG/DL
PUNCTURE CHARGE: NO
RBC # BLD AUTO: 5.16 X10(6)UL
SAO2 % BLDV: 41.2 % (ref 60–85)
SARS-COV-2 RNA RESP QL NAA+PROBE: NOT DETECTED
SODIUM SERPL-SCNC: 127 MMOL/L (ref 136–145)
SODIUM SERPL-SCNC: 134 MMOL/L (ref 136–145)
SP GR UR STRIP: 1.02 (ref 1–1.03)
UROBILINOGEN UR STRIP-ACNC: 0.2
WBC # BLD AUTO: 15.8 X10(3) UL (ref 4–11)

## 2022-12-05 PROCEDURE — 99223 1ST HOSP IP/OBS HIGH 75: CPT | Performed by: HOSPITALIST

## 2022-12-05 RX ORDER — DEXTROSE AND SODIUM CHLORIDE 5; .9 G/100ML; G/100ML
INJECTION, SOLUTION INTRAVENOUS ONCE
Status: COMPLETED | OUTPATIENT
Start: 2022-12-05 | End: 2022-12-06

## 2022-12-05 RX ORDER — NICOTINE POLACRILEX 4 MG
30 LOZENGE BUCCAL
Status: DISCONTINUED | OUTPATIENT
Start: 2022-12-05 | End: 2022-12-06

## 2022-12-05 RX ORDER — SODIUM CHLORIDE 9 MG/ML
INJECTION, SOLUTION INTRAVENOUS CONTINUOUS
Status: DISCONTINUED | OUTPATIENT
Start: 2022-12-06 | End: 2022-12-06

## 2022-12-05 RX ORDER — PROCHLORPERAZINE EDISYLATE 5 MG/ML
5 INJECTION INTRAMUSCULAR; INTRAVENOUS EVERY 8 HOURS PRN
Status: DISCONTINUED | OUTPATIENT
Start: 2022-12-05 | End: 2022-12-06

## 2022-12-05 RX ORDER — DEXTROSE MONOHYDRATE 25 G/50ML
50 INJECTION, SOLUTION INTRAVENOUS
Status: DISCONTINUED | OUTPATIENT
Start: 2022-12-05 | End: 2022-12-06

## 2022-12-05 RX ORDER — ACETAMINOPHEN 500 MG
500 TABLET ORAL EVERY 4 HOURS PRN
Status: DISCONTINUED | OUTPATIENT
Start: 2022-12-05 | End: 2022-12-06

## 2022-12-05 RX ORDER — TEMAZEPAM 15 MG/1
15 CAPSULE ORAL NIGHTLY PRN
Status: DISCONTINUED | OUTPATIENT
Start: 2022-12-05 | End: 2022-12-06

## 2022-12-05 RX ORDER — ONDANSETRON 2 MG/ML
4 INJECTION INTRAMUSCULAR; INTRAVENOUS EVERY 6 HOURS PRN
Status: DISCONTINUED | OUTPATIENT
Start: 2022-12-05 | End: 2022-12-06

## 2022-12-05 RX ORDER — HEPARIN SODIUM 5000 [USP'U]/ML
5000 INJECTION, SOLUTION INTRAVENOUS; SUBCUTANEOUS EVERY 12 HOURS SCHEDULED
Status: DISCONTINUED | OUTPATIENT
Start: 2022-12-06 | End: 2022-12-06

## 2022-12-05 RX ORDER — ONDANSETRON 2 MG/ML
4 INJECTION INTRAMUSCULAR; INTRAVENOUS ONCE
Status: COMPLETED | OUTPATIENT
Start: 2022-12-05 | End: 2022-12-05

## 2022-12-05 RX ORDER — NICOTINE POLACRILEX 4 MG
15 LOZENGE BUCCAL
Status: DISCONTINUED | OUTPATIENT
Start: 2022-12-05 | End: 2022-12-06

## 2022-12-05 RX ORDER — ONDANSETRON 2 MG/ML
4 INJECTION INTRAMUSCULAR; INTRAVENOUS EVERY 4 HOURS PRN
Status: DISCONTINUED | OUTPATIENT
Start: 2022-12-05 | End: 2022-12-05

## 2022-12-05 RX ORDER — SODIUM CHLORIDE 9 MG/ML
INJECTION, SOLUTION INTRAVENOUS CONTINUOUS
Status: DISCONTINUED | OUTPATIENT
Start: 2022-12-05 | End: 2022-12-06

## 2022-12-05 NOTE — H&P
[facility]    PATIENT'S NAME: Waldemar Gibbs   ATTENDING PHYSICIAN: Magdalena Collado MD   PATIENT ACCOUNT#:   [id number]    LOCATION:  [address]  MEDICAL RECORD #:   [id number]       YOB: 1980  ADMISSION DATE:       2022    HISTORY AND PHYSICAL EXAMINATION    CHIEF COMPLAINT:  Diabetic ketoacidosis, dehydration. HISTORY OF PRESENT ILLNESS:  The patient is a 40[facility] with diabetes mellitus type 1 who was started on Ozempic, received the first dose 3 days ago, and since then she has been having nausea, fatigue, poor appetite, vomiting. She did not take her insulin the last 2 days, but she continued to have generalized fatigue, nausea, abdominal discomfort, polyuria, and polydipsia. Today came into the emergency department for evaluation. Glucose 544, sodium 127, potassium 5.3, chloride 88, bicarb 12, anion gap 27, BUN and creatinine of 36 and 1.61. GFR is 40. CBC shows white blood cell count of 15.8. The patient was started on IV fluids, IV insulin drip, and she will be admitted to the hospital for further management. PAST MEDICAL HISTORY:  Diabetes mellitus type 1, rheumatoid arthritis, obesity. PAST SURGICAL HISTORY:   and D and C procedure. MEDICATIONS:  Please see medication reconciliation list.    ALLERGIES:  No known drug allergies. FAMILY HISTORY:  Positive for diabetes mellitus type 2. SOCIAL HISTORY:  No tobacco, alcohol, or drug use. Lives with her family. Independent for basic activities of daily living. REVIEW OF SYSTEMS:  The patient reported after taking the first dose of Ozempic she started developing nausea, poor appetite, and could not eat for 24 hours. She did not take her insulin yesterday or day before, and she continued to have polyuria, abdominal discomfort, nausea, and vomiting. Other 12-point review of systems negative.       PHYSICAL EXAMINATION:    GENERAL:  Alert and oriented to time, place, and person, moderate distress. VITAL SIGNS:  Temperature 98.3. Pulse 129, sinus tachycardia. Respiratory rate 18. Blood pressure 116/76. Pulse ox 97% on room air. HEENT:  Atraumatic. Oropharynx clear. Dry mucous membranes. Ears, nose normal.  Eyes:  Anicteric sclerae. NECK:  Supple. No lymphadenopathy. Trachea midline. Full range of motion. LUNGS:  Chest clear to auscultation bilaterally. Normal respiratory effort. HEART:  Regular rate and rhythm. S1 and S2 auscultated. No murmur. ABDOMEN:  Soft, nondistended. Discomfort to palpation but no point tenderness. Positive bowel sounds. EXTREMITIES:  No peripheral edema, clubbing, or cyanosis. NEUROLOGIC:  Motor and sensory intact. ASSESSMENT AND PLAN:  Diabetic ketoacidosis with high anion gap, dehydration, and acute kidney injury. The patient will be admitted to progressive care unit. IV fluids. IV insulin drip. Monitor electrolytes every 4 hours. Obtain Endocrinology consult. Clear liquid diet for now. Advance diet once her nausea has improved. Further recommendations to follow.     Dictated By Kaylee Chakraborty MD  d: 12/05/2022 16:17:50  t: 12/05/2022 16:21:00  Job [id number]  /

## 2022-12-05 NOTE — ED INITIAL ASSESSMENT (HPI)
PATIENT AOX3 TO ED VIA PRIVATE VEHICLE PATIENT CO OF VOMITING X 3 DAYS WITH SOME DIARRHEA.  UNABLE TO TOLERATE PO

## 2022-12-06 VITALS
OXYGEN SATURATION: 98 % | TEMPERATURE: 98 F | SYSTOLIC BLOOD PRESSURE: 133 MMHG | BODY MASS INDEX: 36 KG/M2 | RESPIRATION RATE: 19 BRPM | WEIGHT: 228.19 LBS | DIASTOLIC BLOOD PRESSURE: 73 MMHG | HEART RATE: 96 BPM

## 2022-12-06 LAB
ANION GAP SERPL CALC-SCNC: 11 MMOL/L (ref 0–18)
ANION GAP SERPL CALC-SCNC: 11 MMOL/L (ref 0–18)
ANION GAP SERPL CALC-SCNC: 13 MMOL/L (ref 0–18)
ANION GAP SERPL CALC-SCNC: 7 MMOL/L (ref 0–18)
ANION GAP SERPL CALC-SCNC: 8 MMOL/L (ref 0–18)
BASE EXCESS BLD CALC-SCNC: -1.5 MMOL/L (ref ?–2)
BASOPHILS # BLD AUTO: 0.04 X10(3) UL (ref 0–0.2)
BASOPHILS NFR BLD AUTO: 0.3 %
BUN BLD-MCNC: 12 MG/DL (ref 7–18)
BUN BLD-MCNC: 14 MG/DL (ref 7–18)
BUN BLD-MCNC: 15 MG/DL (ref 7–18)
BUN BLD-MCNC: 16 MG/DL (ref 7–18)
BUN BLD-MCNC: 20 MG/DL (ref 7–18)
BUN/CREAT SERPL: 12.4 (ref 10–20)
BUN/CREAT SERPL: 13.2 (ref 10–20)
BUN/CREAT SERPL: 15.3 (ref 10–20)
BUN/CREAT SERPL: 15.5 (ref 10–20)
BUN/CREAT SERPL: 16.9 (ref 10–20)
CA-I BLD-SCNC: 1.2 MMOL/L (ref 0.95–1.32)
CALCIUM BLD-MCNC: 8.3 MG/DL (ref 8.5–10.1)
CALCIUM BLD-MCNC: 8.4 MG/DL (ref 8.5–10.1)
CALCIUM BLD-MCNC: 8.5 MG/DL (ref 8.5–10.1)
CALCIUM BLD-MCNC: 8.7 MG/DL (ref 8.5–10.1)
CALCIUM BLD-MCNC: 8.8 MG/DL (ref 8.5–10.1)
CHLORIDE SERPL-SCNC: 102 MMOL/L (ref 98–112)
CHLORIDE SERPL-SCNC: 103 MMOL/L (ref 98–112)
CHLORIDE SERPL-SCNC: 104 MMOL/L (ref 98–112)
CHLORIDE SERPL-SCNC: 104 MMOL/L (ref 98–112)
CHLORIDE SERPL-SCNC: 105 MMOL/L (ref 98–112)
CO2 SERPL-SCNC: 23 MMOL/L (ref 21–32)
CO2 SERPL-SCNC: 23 MMOL/L (ref 21–32)
CO2 SERPL-SCNC: 24 MMOL/L (ref 21–32)
CO2 SERPL-SCNC: 26 MMOL/L (ref 21–32)
CO2 SERPL-SCNC: 27 MMOL/L (ref 21–32)
COHGB MFR BLD: 2.2 % (ref 0–3)
CREAT BLD-MCNC: 0.91 MG/DL
CREAT BLD-MCNC: 0.98 MG/DL
CREAT BLD-MCNC: 1.03 MG/DL
CREAT BLD-MCNC: 1.13 MG/DL
CREAT BLD-MCNC: 1.18 MG/DL
DEPRECATED RDW RBC AUTO: 40.1 FL (ref 35.1–46.3)
EOSINOPHIL # BLD AUTO: 0.03 X10(3) UL (ref 0–0.7)
EOSINOPHIL NFR BLD AUTO: 0.2 %
ERYTHROCYTE [DISTWIDTH] IN BLOOD BY AUTOMATED COUNT: 12.9 % (ref 11–15)
EST. AVERAGE GLUCOSE BLD GHB EST-MCNC: 286 MG/DL (ref 68–126)
GFR SERPLBLD BASED ON 1.73 SQ M-ARVRAT: 58 ML/MIN/1.73M2 (ref 60–?)
GFR SERPLBLD BASED ON 1.73 SQ M-ARVRAT: 62 ML/MIN/1.73M2 (ref 60–?)
GFR SERPLBLD BASED ON 1.73 SQ M-ARVRAT: 69 ML/MIN/1.73M2 (ref 60–?)
GFR SERPLBLD BASED ON 1.73 SQ M-ARVRAT: 73 ML/MIN/1.73M2 (ref 60–?)
GFR SERPLBLD BASED ON 1.73 SQ M-ARVRAT: 80 ML/MIN/1.73M2 (ref 60–?)
GLUCOSE BLD-MCNC: 120 MG/DL (ref 70–99)
GLUCOSE BLD-MCNC: 160 MG/DL (ref 70–99)
GLUCOSE BLD-MCNC: 172 MG/DL (ref 70–99)
GLUCOSE BLD-MCNC: 181 MG/DL (ref 70–99)
GLUCOSE BLD-MCNC: 226 MG/DL (ref 70–99)
GLUCOSE BLDC GLUCOMTR-MCNC: 152 MG/DL (ref 70–99)
GLUCOSE BLDC GLUCOMTR-MCNC: 156 MG/DL (ref 70–99)
GLUCOSE BLDC GLUCOMTR-MCNC: 157 MG/DL (ref 70–99)
GLUCOSE BLDC GLUCOMTR-MCNC: 159 MG/DL (ref 70–99)
GLUCOSE BLDC GLUCOMTR-MCNC: 162 MG/DL (ref 70–99)
GLUCOSE BLDC GLUCOMTR-MCNC: 164 MG/DL (ref 70–99)
GLUCOSE BLDC GLUCOMTR-MCNC: 167 MG/DL (ref 70–99)
GLUCOSE BLDC GLUCOMTR-MCNC: 192 MG/DL (ref 70–99)
GLUCOSE BLDC GLUCOMTR-MCNC: 194 MG/DL (ref 70–99)
GLUCOSE BLDC GLUCOMTR-MCNC: 244 MG/DL (ref 70–99)
HBA1C MFR BLD: 11.6 % (ref ?–5.7)
HCO3 BLDA-SCNC: 23.7 MEQ/L (ref 21–27)
HCT VFR BLD AUTO: 34 %
HGB BLD-MCNC: 10.9 G/DL
HGB BLD-MCNC: 11.8 G/DL
IMM GRANULOCYTES # BLD AUTO: 0.06 X10(3) UL (ref 0–1)
IMM GRANULOCYTES NFR BLD: 0.4 %
LACTATE BLD-SCNC: 1.1 MMOL/L (ref 0.5–2)
LYMPHOCYTES # BLD AUTO: 2.73 X10(3) UL (ref 1–4)
LYMPHOCYTES NFR BLD AUTO: 20 %
MAGNESIUM SERPL-MCNC: 1.8 MG/DL (ref 1.6–2.6)
MAGNESIUM SERPL-MCNC: 1.9 MG/DL (ref 1.6–2.6)
MAGNESIUM SERPL-MCNC: 2.1 MG/DL (ref 1.6–2.6)
MAGNESIUM SERPL-MCNC: 2.2 MG/DL (ref 1.6–2.6)
MAGNESIUM SERPL-MCNC: 2.3 MG/DL (ref 1.6–2.6)
MCH RBC QN AUTO: 27.5 PG (ref 26–34)
MCHC RBC AUTO-ENTMCNC: 32.1 G/DL (ref 31–37)
MCV RBC AUTO: 85.9 FL
METHGB MFR BLD: 1 % SAT (ref 0.4–1.5)
MODIFIED ALLEN TEST: POSITIVE
MONOCYTES # BLD AUTO: 1.41 X10(3) UL (ref 0.1–1)
MONOCYTES NFR BLD AUTO: 10.3 %
NEUTROPHILS # BLD AUTO: 9.38 X10 (3) UL (ref 1.5–7.7)
NEUTROPHILS # BLD AUTO: 9.38 X10(3) UL (ref 1.5–7.7)
NEUTROPHILS NFR BLD AUTO: 68.8 %
O2 CT BLD-SCNC: 15.9 VOL% (ref 15–23)
OSMOLALITY SERPL CALC.SUM OF ELEC: 285 MOSM/KG (ref 275–295)
OSMOLALITY SERPL CALC.SUM OF ELEC: 291 MOSM/KG (ref 275–295)
OSMOLALITY SERPL CALC.SUM OF ELEC: 292 MOSM/KG (ref 275–295)
OSMOLALITY SERPL CALC.SUM OF ELEC: 294 MOSM/KG (ref 275–295)
OSMOLALITY SERPL CALC.SUM OF ELEC: 295 MOSM/KG (ref 275–295)
PCO2 BLDA: 36 MM HG (ref 35–45)
PH BLDA: 7.41 [PH] (ref 7.35–7.45)
PHOSPHATE SERPL-MCNC: 1 MG/DL (ref 2.5–4.9)
PHOSPHATE SERPL-MCNC: 1.4 MG/DL (ref 2.5–4.9)
PHOSPHATE SERPL-MCNC: 1.5 MG/DL (ref 2.5–4.9)
PHOSPHATE SERPL-MCNC: 2.1 MG/DL (ref 2.5–4.9)
PHOSPHATE SERPL-MCNC: 2.4 MG/DL (ref 2.5–4.9)
PLATELET # BLD AUTO: 195 10(3)UL (ref 150–450)
PO2 BLDA: 83 MM HG (ref 80–100)
POTASSIUM BLD-SCNC: 3.8 MMOL/L (ref 3.6–5.1)
POTASSIUM SERPL-SCNC: 3.2 MMOL/L (ref 3.5–5.1)
POTASSIUM SERPL-SCNC: 3.5 MMOL/L (ref 3.5–5.1)
POTASSIUM SERPL-SCNC: 3.6 MMOL/L (ref 3.5–5.1)
POTASSIUM SERPL-SCNC: 3.8 MMOL/L (ref 3.5–5.1)
POTASSIUM SERPL-SCNC: 4.5 MMOL/L (ref 3.5–5.1)
PUNCTURE CHARGE: YES
RBC # BLD AUTO: 3.96 X10(6)UL
SAO2 % BLDA: 98.6 % (ref 94–100)
SODIUM BLD-SCNC: 133 MMOL/L (ref 135–145)
SODIUM SERPL-SCNC: 137 MMOL/L (ref 136–145)
SODIUM SERPL-SCNC: 138 MMOL/L (ref 136–145)
SODIUM SERPL-SCNC: 140 MMOL/L (ref 136–145)
WBC # BLD AUTO: 13.7 X10(3) UL (ref 4–11)

## 2022-12-06 PROCEDURE — 99239 HOSP IP/OBS DSCHRG MGMT >30: CPT | Performed by: HOSPITALIST

## 2022-12-06 RX ORDER — SEMAGLUTIDE 1.34 MG/ML
0.5 INJECTION, SOLUTION SUBCUTANEOUS WEEKLY
COMMUNITY
Start: 2022-11-29 | End: 2022-12-06

## 2022-12-06 RX ORDER — MAGNESIUM SULFATE HEPTAHYDRATE 40 MG/ML
2 INJECTION, SOLUTION INTRAVENOUS ONCE
Status: COMPLETED | OUTPATIENT
Start: 2022-12-06 | End: 2022-12-06

## 2022-12-06 RX ORDER — DEXTROSE AND SODIUM CHLORIDE 5; .9 G/100ML; G/100ML
INJECTION, SOLUTION INTRAVENOUS CONTINUOUS
Status: DISCONTINUED | OUTPATIENT
Start: 2022-12-06 | End: 2022-12-06

## 2022-12-06 RX ORDER — INSULIN DEGLUDEC INJECTION 100 U/ML
28 INJECTION, SOLUTION SUBCUTANEOUS DAILY
COMMUNITY
Start: 2022-11-01

## 2022-12-06 NOTE — DISCHARGE SUMMARY
[facility]    Discharge Summary    Toby Hess Patient Status:  Inpatient    4/3/1980 MRN [id number]   Location [facility] 2W/SW Attending Mario Bartholomew MD   Hosp Day # 1 PCP Lolis Murphy MD     Date of Admission: 2022 Disposition: Home or Self Care     Date of Discharge: 22      Admitting Diagnosis: Nausea and vomiting in adult [R11.2]  Type 1 diabetes mellitus with ketoacidosis without coma ([address] [E10.10]    Hospital Discharge Diagnoses: DKA    Lace+ Score: 34  59-90 High Risk  29-58 Medium Risk  0-28   Low Risk. TCM Follow-Up Recommendation:  LACE 29-58:  Moderate Risk of readmission after discharge from the hospital.      Problem List: Patient Active Problem List:     Dysuria     Vaginal discharge     Rheumatoid aortitis     Urinary frequency     Vaginal itching     Vaginitis and vulvovaginitis     Genital herpes     Diabetes ([address]     Cholelithiasis     Pyelonephritis History of     Essure sterilization     Recurrent vaginitis     Hyperlipidemia     Medical non-compliance     Nontoxic uninodular goiter     Other and unspecified nonspecific immunological findings     Insulin pump titration     Type 1 diabetes mellitus with ketoacidosis without coma (HCC)     Nausea and vomiting in adult      Reason for Admission:   DKA  Uncontrolled diabetes    Physical Exam:   General appearance: alert, appears stated age and cooperative  Pulmonary:  clear to auscultation bilaterally  Cardiovascular: S1, S2 normal, no murmur, click, rub or gallop, regular rate and rhythm  Abdominal: soft, non-tender; bowel sounds normal; no masses,  no organomegaly  Extremities: extremities normal, atraumatic, no cyanosis or edema  Psychiatric: calm      History of Present Illness:   Per Dr. Jd Chun  The patient is a 42-year-old  female with diabetes mellitus type 1 who was started on Ozempic, received the first dose 3 days ago, and since then she has been having nausea, fatigue, poor appetite, vomiting. She did not take her insulin the last 2 days, but she continued to have generalized fatigue, nausea, abdominal discomfort, polyuria, and polydipsia. Today came into the emergency department for evaluation. Glucose 544, sodium 127, potassium 5.3, chloride 88, bicarb 12, anion gap 27, BUN and creatinine of 36 and 1.61. GFR is 40. CBC shows white blood cell count of 15.8. The patient was started on IV fluids, IV insulin drip, and she will be admitted to the hospital for further management. [facility] Course:   DKA  Uncontrolled DM  Dehydration  -Patient had severe nausea and did not eat since starting Ozempic, due to the nausea and no p.o. intake she stopped taking her insulin for 2 days she developed polyuria nausea emesis and came to the ED with DKA and dehydration along with mild KORY. She was admitted for IV fluids and IV insulin she was rapidly corrected and transition to subcu insulin. Of note her hemoglobin A1c is 11.6 it is not well controlled she is going to follow-up with her endocrinologist for further titration of her insulin      Consultations: Endocrinology    Procedures: n/a    Complications: n/a    Discharge Condition: Good    Discharge Medications:      Discharge Medications      CONTINUE taking these medications      Instructions Prescription details   Dexcom G6 Sensor Misc      1 Application by Does not apply route. Refills: 0     NovoLOG 100 UNIT/ML Soln  Generic drug: Insulin Aspart       Refills: 0     Tresiba FlexTouch 100 UNIT/ML Sopn  Generic drug: Insulin Degludec      Inject 28 Units into the skin daily. Refills: 0        STOP taking these medications    Ozempic (0.25 or 0.5 MG/DOSE) 2 MG/1.5ML Sopn  Generic drug: semaglutide               Follow up Visits:  Follow-up with pcp in 1 week    Follow up Labs: accuchecks     Other Discharge Instructions: f/u with endocrinology in one week    Ruben Kiran MD  12/6/2022  4:31 PM    > 35 min

## 2022-12-06 NOTE — PLAN OF CARE
Patient is A&O x4. Stopped insulin gtt at 2pm per MD orders. Levemir given and plan for Novolog with meals as ordered. Denies nausea or vomiting. Tolerating meals. VSS. Patient updated on plan of care. Bed locked and in lowest position. Call light within reach.   Problem: Patient Centered Care  Goal: Patient preferences are identified and integrated in the patient's plan of care  Description: Interventions:  - What would you like us to know as we care for you?   - Provide timely, complete, and accurate information to patient/family  - Incorporate patient and family knowledge, values, beliefs, and cultural backgrounds into the planning and delivery of care  - Encourage patient/family to participate in care and decision-making at the level they choose  - Honor patient and family perspectives and choices  Outcome: Progressing     Problem: Diabetes/Glucose Control  Goal: Glucose maintained within prescribed range  Description: INTERVENTIONS:  - Monitor Blood Glucose as ordered  - Assess for signs and symptoms of hyperglycemia and hypoglycemia  - Administer ordered medications to maintain glucose within target range  - Assess barriers to adequate nutritional intake and initiate nutrition consult as needed  - Instruct patient on self management of diabetes  Outcome: Progressing     Problem: SKIN INTEGRITY  Goal: Skin integrity remains intact  Description: Interventions:  - Assess for areas of redness and/or skin breakdown  - Assess vascular sites hourly  - Change oxygen saturation probe minimum once per shift  - Provide humidity as ordered and per policy  - If on oxygen support, assess nasal septum area for skin breakdown and replace protective barrier if needed  - Change diapers as needed  - Minimize the use of adhesives  Outcome: Progressing  Goal: Incision/wound heals without complications  Description: Interventions:  - Assess wound bed/incision and surrounding skin tissue  - Collaborate with physician/APN and implement wound/incision site care and dressing changes as ordered  - Position infant to avoid placing pressure on wound  - Enterostomal/Wound therapy consult as indicated for ostomies/wounds/G-tubes  Outcome: Progressing     Problem: CARDIOVASCULAR - ADULT  Goal: Maintains optimal cardiac output and hemodynamic stability  Description: INTERVENTIONS:  - Monitor vital signs, rhythm, and trends  - Monitor for bleeding, hypotension and signs of decreased cardiac output  - Evaluate effectiveness of vasoactive medications to optimize hemodynamic stability  - Monitor arterial and/or venous puncture sites for bleeding and/or hematoma  - Assess quality of pulses, skin color and temperature  - Assess for signs of decreased coronary artery perfusion - ex.  Angina  - Evaluate fluid balance, assess for edema, trend weights  Outcome: Progressing  Goal: Absence of cardiac arrhythmias or at baseline  Description: INTERVENTIONS:  - Continuous cardiac monitoring, monitor vital signs, obtain 12 lead EKG if indicated  - Evaluate effectiveness of antiarrhythmic and heart rate control medications as ordered  - Initiate emergency measures for life threatening arrhythmias  - Monitor electrolytes and administer replacement therapy as ordered  Outcome: Progressing     Problem: RESPIRATORY - ADULT  Goal: Achieves optimal ventilation and oxygenation  Description: INTERVENTIONS:  - Assess for changes in respiratory status  - Assess for changes in mentation and behavior  - Position to facilitate oxygenation and minimize respiratory effort  - Oxygen supplementation based on oxygen saturation or ABGs  - Provide Smoking Cessation handout, if applicable  - Encourage broncho-pulmonary hygiene including cough, deep breathe, Incentive Spirometry  - Assess the need for suctioning and perform as needed  - Assess and instruct to report SOB or any respiratory difficulty  - Respiratory Therapy support as indicated  - Manage/alleviate anxiety  - Monitor for signs/symptoms of CO2 retention  Outcome: Progressing     Problem: GASTROINTESTINAL - ADULT  Goal: Minimal or absence of nausea and vomiting  Description: INTERVENTIONS:  - Maintain adequate hydration with IV or PO as ordered and tolerated  - Nasogastric tube to low intermittent suction as ordered  - Evaluate effectiveness of ordered antiemetic medications  - Provide nonpharmacologic comfort measures as appropriate  - Advance diet as tolerated, if ordered  - Obtain nutritional consult as needed  - Evaluate fluid balance  Outcome: Progressing  Goal: Maintains or returns to baseline bowel function  Description: INTERVENTIONS:  - Assess bowel function  - Maintain adequate hydration with IV or PO as ordered and tolerated  - Evaluate effectiveness of GI medications  - Encourage mobilization and activity  - Obtain nutritional consult as needed  - Establish a toileting routine/schedule  - Consider collaborating with pharmacy to review patient's medication profile  Outcome: Progressing  Goal: Achieves appropriate nutritional intake (bariatric)  Description: INTERVENTIONS:  - Monitor for over-consumption  - Identify factors contributing to increased intake, treat as appropriate  - Monitor I&O, WT and lab values  - Obtain nutritional consult as needed  - Evaluate psychosocial factors contributing to over-consumption  Outcome: Progressing     Problem: METABOLIC/FLUID AND ELECTROLYTES - ADULT  Goal: Glucose maintained within prescribed range  Description: INTERVENTIONS:  - Monitor Blood Glucose as ordered  - Assess for signs and symptoms of hyperglycemia and hypoglycemia  - Administer ordered medications to maintain glucose within target range  - Assess barriers to adequate nutritional intake and initiate nutrition consult as needed  - Instruct patient on self management of diabetes  Outcome: Progressing  Goal: Electrolytes maintained within normal limits  Description: INTERVENTIONS:  - Monitor labs and rhythm and assess patient for signs and symptoms of electrolyte imbalances  - Administer electrolyte replacement as ordered  - Monitor response to electrolyte replacements, including rhythm and repeat lab results as appropriate  - Fluid restriction as ordered  - Instruct patient on fluid and nutrition restrictions as appropriate  Outcome: Progressing  Goal: Hemodynamic stability and optimal renal function maintained  Description: INTERVENTIONS:  - Monitor labs and assess for signs and symptoms of volume excess or deficit  - Monitor intake, output and patient weight  - Monitor urine specific gravity, serum osmolarity and serum sodium as indicated or ordered  - Monitor response to interventions for patient's volume status, including labs, urine output, blood pressure (other measures as available)  - Encourage oral intake as appropriate  - Instruct patient on fluid and nutrition restrictions as appropriate  Outcome: Progressing

## 2022-12-06 NOTE — PLAN OF CARE
Patient is A&O x4. Started on insulin gtt. VSS. Patient updated on plan of care. Bed locked and in lowest position. Call light within reach. Problem: Patient Centered Care  Goal: Patient preferences are identified and integrated in the patient's plan of care  Description: Interventions:  - What would you like us to know as we care for you?  I have children  - Provide timely, complete, and accurate information to patient/family  - Incorporate patient and family knowledge, values, beliefs, and cultural backgrounds into the planning and delivery of care  - Encourage patient/family to participate in care and decision-making at the level they choose  - Honor patient and family perspectives and choices  Outcome: Progressing     Problem: Patient/Family Goals  Goal: Patient/Family Long Term Goal  Description: Patient's Long Term Goal: to manage my diabetes    Interventions:  - check blood sugars  - take correct doses of insulin  - See additional Care Plan goals for specific interventions  Outcome: Progressing  Goal: Patient/Family Short Term Goal  Description: Patient's Short Term Goal: to get out of the hospital    - See additional Care Plan goals for specific interventions  Outcome: Progressing     Problem: Diabetes/Glucose Control  Goal: Glucose maintained within prescribed range  Description: INTERVENTIONS:  - Monitor Blood Glucose as ordered  - Assess for signs and symptoms of hyperglycemia and hypoglycemia  - Administer ordered medications to maintain glucose within target range  - Assess barriers to adequate nutritional intake and initiate nutrition consult as needed  - Instruct patient on self management of diabetes  Outcome: Progressing     Problem: SKIN INTEGRITY  Goal: Skin integrity remains intact  Description: Interventions:  - Assess for areas of redness and/or skin breakdown  - Assess vascular sites hourly  - Change oxygen saturation probe minimum once per shift  - Provide humidity as ordered and per policy  - If on oxygen support, assess nasal septum area for skin breakdown and replace protective barrier if needed  - Change diapers as needed  - Minimize the use of adhesives  Outcome: Progressing  Goal: Incision/wound heals without complications  Description: Interventions:  - Assess wound bed/incision and surrounding skin tissue  - Collaborate with physician/APN and implement wound/incision site care and dressing changes as ordered  - Position infant to avoid placing pressure on wound  - Enterostomal/Wound therapy consult as indicated for ostomies/wounds/G-tubes  Outcome: Progressing     Problem: CARDIOVASCULAR - ADULT  Goal: Maintains optimal cardiac output and hemodynamic stability  Description: INTERVENTIONS:  - Monitor vital signs, rhythm, and trends  - Monitor for bleeding, hypotension and signs of decreased cardiac output  - Evaluate effectiveness of vasoactive medications to optimize hemodynamic stability  - Monitor arterial and/or venous puncture sites for bleeding and/or hematoma  - Assess quality of pulses, skin color and temperature  - Assess for signs of decreased coronary artery perfusion - ex.  Angina  - Evaluate fluid balance, assess for edema, trend weights  Outcome: Progressing  Goal: Absence of cardiac arrhythmias or at baseline  Description: INTERVENTIONS:  - Continuous cardiac monitoring, monitor vital signs, obtain 12 lead EKG if indicated  - Evaluate effectiveness of antiarrhythmic and heart rate control medications as ordered  - Initiate emergency measures for life threatening arrhythmias  - Monitor electrolytes and administer replacement therapy as ordered  Outcome: Progressing     Problem: RESPIRATORY - ADULT  Goal: Achieves optimal ventilation and oxygenation  Description: INTERVENTIONS:  - Assess for changes in respiratory status  - Assess for changes in mentation and behavior  - Position to facilitate oxygenation and minimize respiratory effort  - Oxygen supplementation based on oxygen saturation or ABGs  - Provide Smoking Cessation handout, if applicable  - Encourage broncho-pulmonary hygiene including cough, deep breathe, Incentive Spirometry  - Assess the need for suctioning and perform as needed  - Assess and instruct to report SOB or any respiratory difficulty  - Respiratory Therapy support as indicated  - Manage/alleviate anxiety  - Monitor for signs/symptoms of CO2 retention  Outcome: Progressing     Problem: GASTROINTESTINAL - ADULT  Goal: Minimal or absence of nausea and vomiting  Description: INTERVENTIONS:  - Maintain adequate hydration with IV or PO as ordered and tolerated  - Nasogastric tube to low intermittent suction as ordered  - Evaluate effectiveness of ordered antiemetic medications  - Provide nonpharmacologic comfort measures as appropriate  - Advance diet as tolerated, if ordered  - Obtain nutritional consult as needed  - Evaluate fluid balance  Outcome: Progressing  Goal: Maintains or returns to baseline bowel function  Description: INTERVENTIONS:  - Assess bowel function  - Maintain adequate hydration with IV or PO as ordered and tolerated  - Evaluate effectiveness of GI medications  - Encourage mobilization and activity  - Obtain nutritional consult as needed  - Establish a toileting routine/schedule  - Consider collaborating with pharmacy to review patient's medication profile  Outcome: Progressing  Goal: Achieves appropriate nutritional intake (bariatric)  Description: INTERVENTIONS:  - Monitor for over-consumption  - Identify factors contributing to increased intake, treat as appropriate  - Monitor I&O, WT and lab values  - Obtain nutritional consult as needed  - Evaluate psychosocial factors contributing to over-consumption  Outcome: Progressing     Problem: METABOLIC/FLUID AND ELECTROLYTES - ADULT  Goal: Glucose maintained within prescribed range  Description: INTERVENTIONS:  - Monitor Blood Glucose as ordered  - Assess for signs and symptoms of hyperglycemia and hypoglycemia  - Administer ordered medications to maintain glucose within target range  - Assess barriers to adequate nutritional intake and initiate nutrition consult as needed  - Instruct patient on self management of diabetes  Outcome: Progressing  Goal: Electrolytes maintained within normal limits  Description: INTERVENTIONS:  - Monitor labs and rhythm and assess patient for signs and symptoms of electrolyte imbalances  - Administer electrolyte replacement as ordered  - Monitor response to electrolyte replacements, including rhythm and repeat lab results as appropriate  - Fluid restriction as ordered  - Instruct patient on fluid and nutrition restrictions as appropriate  Outcome: Progressing  Goal: Hemodynamic stability and optimal renal function maintained  Description: INTERVENTIONS:  - Monitor labs and assess for signs and symptoms of volume excess or deficit  - Monitor intake, output and patient weight  - Monitor urine specific gravity, serum osmolarity and serum sodium as indicated or ordered  - Monitor response to interventions for patient's volume status, including labs, urine output, blood pressure (other measures as available)  - Encourage oral intake as appropriate  - Instruct patient on fluid and nutrition restrictions as appropriate  Outcome: Progressing

## 2022-12-06 NOTE — DISCHARGE INSTRUCTIONS
Follow-up with your endocrinologist in 1 to 2 weeks  Blood sugar before meals and bedtime take results to PCP to further adjust meds

## 2022-12-07 NOTE — DISCHARGE PLANNING
Reviewed discharge AVS with Flaco Quintero, all questions answered. Discharged to home with all belongings.  Plan to follow-up with her endocrinologist.

## 2022-12-12 NOTE — PAYOR COMM NOTE
Discharge Notification    Patient Name: Elizabeth Bal: [facility]  Subscriber #: [id number]  Authorization Number: [id number]  Admit Date/Time: 12/5/2022 3:10 PM  Discharge Date/Time: 12/6/2022 7:17 PM

## 2022-12-15 ENCOUNTER — PATIENT OUTREACH (OUTPATIENT)
Dept: CASE MANAGEMENT | Age: 42
End: 2022-12-15

## 2022-12-15 NOTE — PROGRESS NOTES
1st attempt to review DM f/u questions    Pt answered all questions  Closing encounter  Diabetic Outreach D/C Follow Up Questions:     1. Did you receive the information provided during your hospitalization and at discharge about diabetes? yes    If No:  Would you like us to mail you the information? no   If Yes:  Was the information helpful? Yes              2. Do you have all of your diabetes medications now that you are home? Yes  If yes:  do you understand how to take your medications. yes    If No: Are there barriers to getting your medications? no       3. Did you make the necessary transportation arrangements to get to your diabetes follow-up appointment? Yes         If pt answers No to questions #2 and #3 Advise pt you will have a clinical person contact them to address.

## 2023-09-04 ENCOUNTER — HOSPITAL ENCOUNTER (EMERGENCY)
Facility: HOSPITAL | Age: 43
Discharge: HOME | End: 2023-09-04
Attending: EMERGENCY MEDICINE
Payer: COMMERCIAL

## 2023-09-04 VITALS
TEMPERATURE: 99 F | HEIGHT: 67 IN | OXYGEN SATURATION: 99 % | SYSTOLIC BLOOD PRESSURE: 115 MMHG | BODY MASS INDEX: 34.53 KG/M2 | HEART RATE: 106 BPM | RESPIRATION RATE: 18 BRPM | WEIGHT: 220 LBS | DIASTOLIC BLOOD PRESSURE: 73 MMHG

## 2023-09-04 DIAGNOSIS — N61.1 BREAST ABSCESS: Primary | ICD-10-CM

## 2023-09-04 LAB
ALBUMIN SERPL-MCNC: 3.2 G/DL (ref 3.4–5)
ALBUMIN/GLOB SERPL: 0.8 {RATIO} (ref 1–2)
ALP LIVER SERPL-CCNC: 104 U/L
ALT SERPL-CCNC: 10 U/L
ANION GAP SERPL CALC-SCNC: 7 MMOL/L (ref 0–18)
AST SERPL-CCNC: 10 U/L (ref 15–37)
BASOPHILS # BLD AUTO: 0.03 X10(3) UL (ref 0–0.2)
BASOPHILS NFR BLD AUTO: 0.3 %
BILIRUB SERPL-MCNC: 0.3 MG/DL (ref 0.1–2)
BUN BLD-MCNC: 12 MG/DL (ref 7–18)
BUN/CREAT SERPL: 10.1 (ref 10–20)
CALCIUM BLD-MCNC: 9.2 MG/DL (ref 8.5–10.1)
CHLORIDE SERPL-SCNC: 99 MMOL/L (ref 98–112)
CO2 SERPL-SCNC: 29 MMOL/L (ref 21–32)
CREAT BLD-MCNC: 1.19 MG/DL
DEPRECATED RDW RBC AUTO: 39.4 FL (ref 35.1–46.3)
EGFRCR SERPLBLD CKD-EPI 2021: 57 ML/MIN/1.73M2 (ref 60–?)
EOSINOPHIL # BLD AUTO: 0.12 X10(3) UL (ref 0–0.7)
EOSINOPHIL NFR BLD AUTO: 1.4 %
ERYTHROCYTE [DISTWIDTH] IN BLOOD BY AUTOMATED COUNT: 12.8 % (ref 11–15)
GLOBULIN PLAS-MCNC: 4.2 G/DL (ref 2.8–4.4)
GLUCOSE BLD-MCNC: 289 MG/DL (ref 70–99)
GLUCOSE BLDC GLUCOMTR-MCNC: 376 MG/DL (ref 70–99)
HCT VFR BLD AUTO: 40.1 %
HGB BLD-MCNC: 13.1 G/DL
IMM GRANULOCYTES # BLD AUTO: 0.01 X10(3) UL (ref 0–1)
IMM GRANULOCYTES NFR BLD: 0.1 %
LYMPHOCYTES # BLD AUTO: 1.17 X10(3) UL (ref 1–4)
LYMPHOCYTES NFR BLD AUTO: 13.6 %
MCH RBC QN AUTO: 27.4 PG (ref 26–34)
MCHC RBC AUTO-ENTMCNC: 32.7 G/DL (ref 31–37)
MCV RBC AUTO: 83.9 FL
MONOCYTES # BLD AUTO: 0.9 X10(3) UL (ref 0.1–1)
MONOCYTES NFR BLD AUTO: 10.4 %
NEUTROPHILS # BLD AUTO: 6.4 X10 (3) UL (ref 1.5–7.7)
NEUTROPHILS # BLD AUTO: 6.4 X10(3) UL (ref 1.5–7.7)
NEUTROPHILS NFR BLD AUTO: 74.2 %
OSMOLALITY SERPL CALC.SUM OF ELEC: 290 MOSM/KG (ref 275–295)
PLATELET # BLD AUTO: 234 10(3)UL (ref 150–450)
POTASSIUM SERPL-SCNC: 3.8 MMOL/L (ref 3.5–5.1)
PROT SERPL-MCNC: 7.4 G/DL (ref 6.4–8.2)
RBC # BLD AUTO: 4.78 X10(6)UL
SODIUM SERPL-SCNC: 135 MMOL/L (ref 136–145)
WBC # BLD AUTO: 8.6 X10(3) UL (ref 4–11)

## 2023-09-04 PROCEDURE — 87070 CULTURE OTHR SPECIMN AEROBIC: CPT | Performed by: EMERGENCY MEDICINE

## 2023-09-04 PROCEDURE — 36415 COLL VENOUS BLD VENIPUNCTURE: CPT

## 2023-09-04 PROCEDURE — 87205 SMEAR GRAM STAIN: CPT | Performed by: EMERGENCY MEDICINE

## 2023-09-04 PROCEDURE — 10160 PNXR ASPIR ABSC HMTMA BULLA: CPT

## 2023-09-04 PROCEDURE — 99284 EMERGENCY DEPT VISIT MOD MDM: CPT

## 2023-09-04 PROCEDURE — 80053 COMPREHEN METABOLIC PANEL: CPT | Performed by: EMERGENCY MEDICINE

## 2023-09-04 PROCEDURE — 82962 GLUCOSE BLOOD TEST: CPT

## 2023-09-04 PROCEDURE — 85025 COMPLETE CBC W/AUTO DIFF WBC: CPT | Performed by: EMERGENCY MEDICINE

## 2023-09-04 PROCEDURE — 87077 CULTURE AEROBIC IDENTIFY: CPT | Performed by: EMERGENCY MEDICINE

## 2023-09-04 PROCEDURE — 99283 EMERGENCY DEPT VISIT LOW MDM: CPT

## 2023-09-04 RX ORDER — DOXYCYCLINE HYCLATE 100 MG/1
100 CAPSULE ORAL ONCE
Status: COMPLETED | OUTPATIENT
Start: 2023-09-04 | End: 2023-09-04

## 2023-09-04 RX ORDER — LIDOCAINE HYDROCHLORIDE 10 MG/ML
20 INJECTION, SOLUTION EPIDURAL; INFILTRATION; INTRACAUDAL; PERINEURAL ONCE
Status: COMPLETED | OUTPATIENT
Start: 2023-09-04 | End: 2023-09-04

## 2023-09-04 RX ORDER — IBUPROFEN 600 MG/1
600 TABLET ORAL EVERY 8 HOURS PRN
Qty: 15 TABLET | Refills: 0 | Status: SHIPPED | OUTPATIENT
Start: 2023-09-04 | End: 2023-09-09

## 2023-09-04 RX ORDER — DOXYCYCLINE HYCLATE 100 MG/1
100 CAPSULE ORAL 2 TIMES DAILY
Qty: 14 CAPSULE | Refills: 0 | Status: SHIPPED | OUTPATIENT
Start: 2023-09-04 | End: 2023-09-11

## 2023-09-04 NOTE — ED INITIAL ASSESSMENT (HPI)
Pt arrived to ED c/o painful cyst to right breast, pt reports she had it drained here approximately 1 year prior, pt reports that it has returned. Pt reports fever starting yesterday, reports she has been taking tylenol and and ibuprofen at home.

## 2023-09-06 RX ORDER — AMOXICILLIN AND CLAVULANATE POTASSIUM 875; 125 MG/1; MG/1
1 TABLET, FILM COATED ORAL 2 TIMES DAILY
Qty: 14 TABLET | Refills: 0 | Status: SHIPPED | OUTPATIENT
Start: 2023-09-06 | End: 2023-09-13

## 2023-09-08 NOTE — PROGRESS NOTES
ED Culture Callback Results Review  Pharmacist reviewed culture results from ED visit     Positive   abscess  culture noted which is not susceptible to previously prescribed doxycycline. Antibiotic changed to Augmentin 875/125 mg PO BID for 7 days. New prescription was sent to [facility] by pharmacist. Patient was unable to be reached after 3 attempts. Certified letter requested for patient.

## 2024-09-15 ENCOUNTER — HOSPITAL ENCOUNTER (INPATIENT)
Facility: HOSPITAL | Age: 44
LOS: 1 days | Discharge: HOME | End: 2024-09-16
Attending: EMERGENCY MEDICINE | Admitting: EMERGENCY MEDICINE
Payer: COMMERCIAL

## 2024-09-15 DIAGNOSIS — E10.10 TYPE 1 DIABETES MELLITUS WITH KETOACIDOSIS WITHOUT COMA (HCC): ICD-10-CM

## 2024-09-15 DIAGNOSIS — N17.9 ACUTE KIDNEY INJURY (HCC): Primary | ICD-10-CM

## 2024-09-15 DIAGNOSIS — E87.20 METABOLIC ACIDOSIS: ICD-10-CM

## 2024-09-15 LAB
ALBUMIN SERPL-MCNC: 4.6 G/DL (ref 3.2–4.8)
ALP LIVER SERPL-CCNC: 112 U/L
ALT SERPL-CCNC: 8 U/L
ANION GAP SERPL CALC-SCNC: 18 MMOL/L (ref 0–18)
ANION GAP SERPL CALC-SCNC: 19 MMOL/L (ref 0–18)
ANION GAP SERPL CALC-SCNC: 21 MMOL/L (ref 0–18)
AST SERPL-CCNC: 14 U/L (ref ?–34)
BASE EXCESS BLD CALC-SCNC: -17.9 MMOL/L (ref ?–2)
BASOPHILS # BLD AUTO: 0.07 X10(3) UL (ref 0–0.2)
BASOPHILS NFR BLD AUTO: 0.7 %
BILIRUB DIRECT SERPL-MCNC: 0.2 MG/DL (ref ?–0.3)
BILIRUB SERPL-MCNC: 0.5 MG/DL (ref 0.3–1.2)
BILIRUB UR QL: NEGATIVE
BUN BLD-MCNC: 13 MG/DL (ref 9–23)
BUN BLD-MCNC: 16 MG/DL (ref 9–23)
BUN BLD-MCNC: 17 MG/DL (ref 9–23)
BUN/CREAT SERPL: 11 (ref 10–20)
BUN/CREAT SERPL: 12 (ref 10–20)
BUN/CREAT SERPL: 13.8 (ref 10–20)
CALCIUM BLD-MCNC: 9.1 MG/DL (ref 8.7–10.4)
CALCIUM BLD-MCNC: 9.5 MG/DL (ref 8.7–10.4)
CALCIUM BLD-MCNC: 9.6 MG/DL (ref 8.7–10.4)
CHLORIDE SERPL-SCNC: 104 MMOL/L (ref 98–112)
CHLORIDE SERPL-SCNC: 104 MMOL/L (ref 98–112)
CHLORIDE SERPL-SCNC: 98 MMOL/L (ref 98–112)
CLARITY UR: CLEAR
CO2 SERPL-SCNC: 12 MMOL/L (ref 21–32)
CO2 SERPL-SCNC: 14 MMOL/L (ref 21–32)
CO2 SERPL-SCNC: 14 MMOL/L (ref 21–32)
CREAT BLD-MCNC: 1.08 MG/DL
CREAT BLD-MCNC: 1.23 MG/DL
CREAT BLD-MCNC: 1.46 MG/DL
DEPRECATED RDW RBC AUTO: 41.6 FL (ref 35.1–46.3)
EGFRCR SERPLBLD CKD-EPI 2021: 45 ML/MIN/1.73M2 (ref 60–?)
EGFRCR SERPLBLD CKD-EPI 2021: 55 ML/MIN/1.73M2 (ref 60–?)
EGFRCR SERPLBLD CKD-EPI 2021: 64 ML/MIN/1.73M2 (ref 60–?)
EOSINOPHIL # BLD AUTO: 0.02 X10(3) UL (ref 0–0.7)
EOSINOPHIL NFR BLD AUTO: 0.2 %
ERYTHROCYTE [DISTWIDTH] IN BLOOD BY AUTOMATED COUNT: 13.1 % (ref 11–15)
EST. AVERAGE GLUCOSE BLD GHB EST-MCNC: 369 MG/DL (ref 68–126)
GLUCOSE BLD-MCNC: 161 MG/DL (ref 70–99)
GLUCOSE BLD-MCNC: 179 MG/DL (ref 70–99)
GLUCOSE BLD-MCNC: 333 MG/DL (ref 70–99)
GLUCOSE BLDC GLUCOMTR-MCNC: 118 MG/DL (ref 70–99)
GLUCOSE BLDC GLUCOMTR-MCNC: 119 MG/DL (ref 70–99)
GLUCOSE BLDC GLUCOMTR-MCNC: 121 MG/DL (ref 70–99)
GLUCOSE BLDC GLUCOMTR-MCNC: 124 MG/DL (ref 70–99)
GLUCOSE BLDC GLUCOMTR-MCNC: 130 MG/DL (ref 70–99)
GLUCOSE BLDC GLUCOMTR-MCNC: 136 MG/DL (ref 70–99)
GLUCOSE BLDC GLUCOMTR-MCNC: 142 MG/DL (ref 70–99)
GLUCOSE BLDC GLUCOMTR-MCNC: 143 MG/DL (ref 70–99)
GLUCOSE BLDC GLUCOMTR-MCNC: 145 MG/DL (ref 70–99)
GLUCOSE BLDC GLUCOMTR-MCNC: 147 MG/DL (ref 70–99)
GLUCOSE BLDC GLUCOMTR-MCNC: 148 MG/DL (ref 70–99)
GLUCOSE BLDC GLUCOMTR-MCNC: 158 MG/DL (ref 70–99)
GLUCOSE BLDC GLUCOMTR-MCNC: 170 MG/DL (ref 70–99)
GLUCOSE BLDC GLUCOMTR-MCNC: 253 MG/DL (ref 70–99)
GLUCOSE BLDC GLUCOMTR-MCNC: 326 MG/DL (ref 70–99)
GLUCOSE UR-MCNC: >1000 MG/DL
HBA1C MFR BLD: 14.5 % (ref ?–5.7)
HCO3 BLDA-SCNC: 10.7 MEQ/L (ref 21–27)
HCT VFR BLD AUTO: 43.5 %
HGB BLD-MCNC: 13.9 G/DL
HYALINE CASTS #/AREA URNS AUTO: PRESENT /LPF
IMM GRANULOCYTES # BLD AUTO: 0.04 X10(3) UL (ref 0–1)
IMM GRANULOCYTES NFR BLD: 0.4 %
KETONES UR-MCNC: >150 MG/DL
LEUKOCYTE ESTERASE UR QL STRIP.AUTO: NEGATIVE
LYMPHOCYTES # BLD AUTO: 1.56 X10(3) UL (ref 1–4)
LYMPHOCYTES NFR BLD AUTO: 15.1 %
MAGNESIUM SERPL-MCNC: 1.8 MG/DL (ref 1.6–2.6)
MAGNESIUM SERPL-MCNC: 2.6 MG/DL (ref 1.6–2.6)
MCH RBC QN AUTO: 27.7 PG (ref 26–34)
MCHC RBC AUTO-ENTMCNC: 32 G/DL (ref 31–37)
MCV RBC AUTO: 86.8 FL
MODIFIED ALLEN TEST: POSITIVE
MONOCYTES # BLD AUTO: 0.91 X10(3) UL (ref 0.1–1)
MONOCYTES NFR BLD AUTO: 8.8 %
NEUTROPHILS # BLD AUTO: 7.71 X10 (3) UL (ref 1.5–7.7)
NEUTROPHILS # BLD AUTO: 7.71 X10(3) UL (ref 1.5–7.7)
NEUTROPHILS NFR BLD AUTO: 74.8 %
NITRITE UR QL STRIP.AUTO: NEGATIVE
O2 CT BLD-SCNC: 18.8 VOL% (ref 15–23)
OSMOLALITY SERPL CALC.SUM OF ELEC: 286 MOSM/KG (ref 275–295)
OSMOLALITY SERPL CALC.SUM OF ELEC: 286 MOSM/KG (ref 275–295)
OSMOLALITY SERPL CALC.SUM OF ELEC: 290 MOSM/KG (ref 275–295)
PCO2 BLDA: 24 MM HG (ref 35–45)
PH BLDA: 7.17 [PH] (ref 7.35–7.45)
PH UR: 5 [PH] (ref 5–8)
PHOSPHATE SERPL-MCNC: 2.4 MG/DL (ref 2.4–5.1)
PLATELET # BLD AUTO: 310 10(3)UL (ref 150–450)
PO2 BLDA: 71 MM HG (ref 80–100)
POTASSIUM SERPL-SCNC: 4.2 MMOL/L (ref 3.5–5.1)
POTASSIUM SERPL-SCNC: 4.2 MMOL/L (ref 3.5–5.1)
POTASSIUM SERPL-SCNC: 4.9 MMOL/L (ref 3.5–5.1)
PROT SERPL-MCNC: 8.5 G/DL (ref 5.7–8.2)
PROT UR-MCNC: 30 MG/DL
PUNCTURE CHARGE: YES
RBC # BLD AUTO: 5.01 X10(6)UL
SAO2 % BLDA: 93.5 % (ref 94–100)
SARS-COV-2 RNA RESP QL NAA+PROBE: NOT DETECTED
SODIUM SERPL-SCNC: 131 MMOL/L (ref 136–145)
SODIUM SERPL-SCNC: 136 MMOL/L (ref 136–145)
SODIUM SERPL-SCNC: 137 MMOL/L (ref 136–145)
SP GR UR STRIP: 1.02 (ref 1–1.03)
UROBILINOGEN UR STRIP-ACNC: NORMAL
WBC # BLD AUTO: 10.3 X10(3) UL (ref 4–11)

## 2024-09-15 PROCEDURE — 99291 CRITICAL CARE FIRST HOUR: CPT

## 2024-09-15 RX ORDER — POLYETHYLENE GLYCOL 3350 17 G/17G
17 POWDER, FOR SOLUTION ORAL DAILY PRN
Status: DISCONTINUED | OUTPATIENT
Start: 2024-09-15 | End: 2024-09-16

## 2024-09-15 RX ORDER — BISACODYL 10 MG
10 SUPPOSITORY, RECTAL RECTAL
Status: DISCONTINUED | OUTPATIENT
Start: 2024-09-15 | End: 2024-09-16

## 2024-09-15 RX ORDER — ONDANSETRON 2 MG/ML
4 INJECTION INTRAMUSCULAR; INTRAVENOUS ONCE
Status: COMPLETED | OUTPATIENT
Start: 2024-09-15 | End: 2024-09-15

## 2024-09-15 RX ORDER — ACETAMINOPHEN 500 MG
500 TABLET ORAL EVERY 4 HOURS PRN
Status: DISCONTINUED | OUTPATIENT
Start: 2024-09-15 | End: 2024-09-16

## 2024-09-15 RX ORDER — ONDANSETRON 2 MG/ML
4 INJECTION INTRAMUSCULAR; INTRAVENOUS EVERY 6 HOURS PRN
Status: DISCONTINUED | OUTPATIENT
Start: 2024-09-15 | End: 2024-09-16

## 2024-09-15 RX ORDER — HYDROXYCHLOROQUINE SULFATE 200 MG/1
200 TABLET, FILM COATED ORAL DAILY
COMMUNITY

## 2024-09-15 RX ORDER — DEXTROSE MONOHYDRATE 25 G/50ML
50 INJECTION, SOLUTION INTRAVENOUS
Status: DISCONTINUED | OUTPATIENT
Start: 2024-09-15 | End: 2024-09-16

## 2024-09-15 RX ORDER — MAGNESIUM SULFATE HEPTAHYDRATE 40 MG/ML
2 INJECTION, SOLUTION INTRAVENOUS ONCE
Status: COMPLETED | OUTPATIENT
Start: 2024-09-15 | End: 2024-09-15

## 2024-09-15 RX ORDER — NICOTINE POLACRILEX 4 MG
30 LOZENGE BUCCAL
Status: DISCONTINUED | OUTPATIENT
Start: 2024-09-15 | End: 2024-09-16

## 2024-09-15 RX ORDER — MAGNESIUM OXIDE 400 MG/1
400 TABLET ORAL ONCE
Status: DISCONTINUED | OUTPATIENT
Start: 2024-09-15 | End: 2024-09-15

## 2024-09-15 RX ORDER — SODIUM CHLORIDE 9 MG/ML
INJECTION, SOLUTION INTRAVENOUS CONTINUOUS
Status: DISCONTINUED | OUTPATIENT
Start: 2024-09-15 | End: 2024-09-16

## 2024-09-15 RX ORDER — NICOTINE POLACRILEX 4 MG
15 LOZENGE BUCCAL
Status: DISCONTINUED | OUTPATIENT
Start: 2024-09-15 | End: 2024-09-16

## 2024-09-15 RX ORDER — SENNOSIDES 8.6 MG
17.2 TABLET ORAL NIGHTLY PRN
Status: DISCONTINUED | OUTPATIENT
Start: 2024-09-15 | End: 2024-09-16

## 2024-09-15 RX ORDER — HEPARIN SODIUM 5000 [USP'U]/ML
5000 INJECTION, SOLUTION INTRAVENOUS; SUBCUTANEOUS EVERY 8 HOURS SCHEDULED
Status: DISCONTINUED | OUTPATIENT
Start: 2024-09-15 | End: 2024-09-16

## 2024-09-15 RX ORDER — SODIUM PHOSPHATE, DIBASIC AND SODIUM PHOSPHATE, MONOBASIC 7; 19 G/230ML; G/230ML
1 ENEMA RECTAL ONCE AS NEEDED
Status: DISCONTINUED | OUTPATIENT
Start: 2024-09-15 | End: 2024-09-16

## 2024-09-15 RX ORDER — PROCHLORPERAZINE EDISYLATE 5 MG/ML
5 INJECTION INTRAMUSCULAR; INTRAVENOUS EVERY 8 HOURS PRN
Status: DISCONTINUED | OUTPATIENT
Start: 2024-09-15 | End: 2024-09-16

## 2024-09-15 NOTE — ED PROVIDER NOTES
Patient Seen in: [facility] Emergency Department      History     Chief Complaint   Patient presents with    Nausea/Vomiting/Diarrhea     Stated Complaint: N/V/D    Subjective:   HPI    Patient is a 44-year-old female who presents with vomiting since 12 PM.  No diarrhea, fevers or abdominal pain.  Denies any dizziness but feels weak.  States her glucoses frequently run high.  Denies any recent sick contacts or bad food intake.    Objective:   Past Medical History:    Diabetes mellitus (HCC)    IDDM (insulin dependent diabetes mellitus)    RA (rheumatoid arthritis) (HCC)    Type 1 diabetes mellitus (HCC)              Past Surgical History:   Procedure Laterality Date    Anesthesia for;  procedures        10/2012    Hysteroscopy, sterilization      ESSURE                Social History     Socioeconomic History    Marital status:     Number of children: 2   Occupational History    Occupation: HR   Tobacco Use    Smoking status: Never    Smokeless tobacco: Never   Vaping Use    Vaping status: Never Used   Substance and Sexual Activity    Alcohol use: Yes     Comment: social    Drug use: No    Sexual activity: Yes     Partners: Male     Comment: ESSURE DONE 2013   Other Topics Concern    Blood Transfusions Yes     Comment: WITH  10/2012   Social History Narrative    NO HISTORY OF DOMESTIC AND SEXUAL ABUSE      Social Determinants of Health     Financial Resource Strain: Patient Declined (2024)    Received from [facility]    Overall Financial Resource Strain (CARDIA)     Difficulty of Paying Living Expenses: Patient declined   Food Insecurity: Patient Declined (2024)    Received from [facility]    Hunger Vital Sign     Worried About Running Out of Food in the Last Year: Patient declined     Ran Out of Food in the Last Year: Patient declined   Transportation Needs: Patient Declined (2024)    Received from [facility]    PRAPARE - Transportation     Lack of Transportation (Medical): Patient declined     Lack of Transportation (Non-Medical): Patient declined   Housing Stability: Patient Declined (5/14/2024)    Received from [facility]    Housing Stability Vital Sign     Unable to Pay for Housing in the Last Year: Patient declined     Unstable Housing in the Last Year: Patient declined              Review of Systems    Positive for stated Chief Complaint: Nausea/Vomiting/Diarrhea    Other systems are as noted in HPI.  Constitutional and vital signs reviewed.      All other systems reviewed and negative except as noted above.    Physical Exam     ED Triage Vitals [09/15/24 0343]   /69   Pulse 117   Resp 20   Temp 98.2 °F (36.8 °C)   Temp src Oral   SpO2 99 %   O2 Device None (Room air)       Current Vitals:   Vital Signs  BP: 118/63  Pulse: 116  Resp: 24  Temp: 98.2 °F (36.8 °C)  Temp src: Oral  MAP (mmHg): 81    Oxygen Therapy  SpO2: 100 %  O2 Device: None (Room air)            Physical Exam    GENERAL: some distress, awake and alert  HEENT: EOMI, PERRL, MM dry  Neck: supple  CV: tachycardic, no murmurs  Resp: CTAB, no wheezes or retractions  Ab: soft, nontender, no distension  Extremities: FROM of all extremities  Neuro: CN intact, normal speech, normal gait, 5/5 motor strength in all extremities, no focal deficits  SKIN: warm, dry, no rashes      ED Course     Labs Reviewed   BASIC METABOLIC PANEL (8) - Abnormal; Notable for the following components:       Result Value    Glucose 333 (*)     Sodium 131 (*)     CO2 12.0 (*)     Anion Gap 21 (*)     Creatinine 1.46 (*)     eGFR-Cr 45 (*)     All other components within normal limits   HEPATIC FUNCTION PANEL (7) - Abnormal; Notable for the following components:    ALT 8 (*)     Alkaline Phosphatase 112 (*)     Total Protein 8.5 (*)     All other components within normal limits   ACETONE - Abnormal; Notable for the following components:     Acetone Small (*)     All other components within normal limits   ARTERIAL BLOOD GAS - Abnormal; Notable for the following components:    ABG pH 7.17 (*)     ABG pCO2 24 (*)     ABG PO2 71 (*)     ABG HCO3 10.7 (*)     Blood Gas Base Excess -17.9 (*)     ABG O2 Saturation 93.5 (*)     All other components within normal limits   POCT GLUCOSE - Abnormal; Notable for the following components:    POC Glucose  326 (*)     All other components within normal limits   CBC WITH DIFFERENTIAL WITH PLATELET   URINALYSIS WITH CULTURE REFLEX   MAGNESIUM          MDM         Medical Decision Making  Ddx: gastroenteritis, dehydration, DKA, electrolyte abnormality    Ivf/zofran  Pt notified of results.  Labs remarkable for elevated glucose, metabolic acidosis. Pt started on insulin drip.     Amount and/or Complexity of Data Reviewed  External Data Reviewed: labs.     Details: Labs 2024 reviewed  Labs: ordered.  Discussion of management or test interpretation with external provider(s): D/w dr Noel Platt notified    Risk  Drug therapy requiring intensive monitoring for toxicity.  Decision regarding hospitalization.    Critical Care  Total time providing critical care: 45 minutes        Disposition and Plan     Clinical Impression:  1. Acute kidney injury (HCC)    2. Type 1 diabetes mellitus with ketoacidosis without coma (HCC)    3. Metabolic acidosis         Disposition:  Admit  9/15/2024  5:09 am    Follow-up:  No follow-up provider specified.        Medications Prescribed:  Current Discharge Medication List                            Hospital Problems       Present on Admission  Date Reviewed: 8/20/2020            ICD-10-CM Noted POA    * (Principal) Acute kidney injury (HCC) N17.9 9/15/2024 Unknown

## 2024-09-15 NOTE — ED INITIAL ASSESSMENT (HPI)
Pt presents with c/o vomiting 8 times since yesterday.  Pt denies have any diarrhea.  Pt has no abdominal pain.

## 2024-09-15 NOTE — PROGRESS NOTES
Admitted to PCCU with DKA, ongoing vomiting prior to admission        9/15/2024     5:30 PM 9/15/2024     6:00 PM   Vitals History   /85 118/73   BP Location  Right arm   Pulse 118    Resp 22    Weight  206 lbs 13 oz   Height  170.2 cm (5' 7\")   BMI  32.39 kg/m2      Lab Results   Component Value Date    WBC 10.3 09/15/2024    HGB 13.9 09/15/2024    HCT 43.5 09/15/2024    .0 09/15/2024    CREATSERUM 1.23 09/15/2024    BUN 17 09/15/2024     09/15/2024    K 4.2 09/15/2024     09/15/2024    CO2 14.0 09/15/2024     09/15/2024    CA 9.6 09/15/2024    ALB 4.6 09/15/2024    ALKPHO 112 09/15/2024    BILT 0.5 09/15/2024    TP 8.5 09/15/2024    AST 14 09/15/2024    ALT 8 09/15/2024    MG 1.8 09/15/2024        Latest Reference Range & Units 09/15/24 04:18   ABG PH 7.35 - 7.45  7.17 (LL)   ABG PCO2 35 - 45 mm Hg 24 (L)   ABG PO2 80 - 100 mm Hg 71 (L)   ABG HCO3 21.0 - 27.0 mEq/L 10.7 (L)   ABG O2 SATURATION 94.0 - 100.0 % 93.5 (L)   Blood Gas Base Excess -2.0 - 2.0 mmol/L -17.9 (L)   Oxygen Delivery Device  Room Air   Oxygen Content 15.0 - 23.0 Vol% 18.8   SAMPLE SITE  Right Radial   (LL): Data is critically low  (L): Data is abnormally low    Metabolic acidosis    Plan:  Insulin drip protocol  Aggressive IVF  NPO with sips of clears   Endocrine consult  Electrolyte replacement as needed  Repeat ABG in am    Admission orders completed     Catalina Glass NP  PCCU     35 min critical care time

## 2024-09-15 NOTE — ED QUICK NOTES
Orders for admission, patient is aware of plan and ready to go upstairs. Any questions, please call ED RN Alka at extension [phone].     Patient Covid vaccination status: Fully vaccinated     COVID Test Ordered in ED: None    COVID Suspicion at Admission: N/A    Running Infusions:    insulin regular          Mental Status/LOC at time of transport: AOx4    Other pertinent information:   CIWA score: N/A   NIH score:  N/A

## 2024-09-16 VITALS
SYSTOLIC BLOOD PRESSURE: 118 MMHG | DIASTOLIC BLOOD PRESSURE: 75 MMHG | HEART RATE: 100 BPM | OXYGEN SATURATION: 96 % | RESPIRATION RATE: 22 BRPM | WEIGHT: 206.81 LBS | BODY MASS INDEX: 32.46 KG/M2 | TEMPERATURE: 97 F | HEIGHT: 67 IN

## 2024-09-16 LAB
ANION GAP SERPL CALC-SCNC: 12 MMOL/L (ref 0–18)
ANION GAP SERPL CALC-SCNC: 13 MMOL/L (ref 0–18)
ANION GAP SERPL CALC-SCNC: 16 MMOL/L (ref 0–18)
ANION GAP SERPL CALC-SCNC: 7 MMOL/L (ref 0–18)
ANION GAP SERPL CALC-SCNC: 9 MMOL/L (ref 0–18)
ANION GAP SERPL CALC-SCNC: 9 MMOL/L (ref 0–18)
BASOPHILS # BLD AUTO: 0.05 X10(3) UL (ref 0–0.2)
BASOPHILS NFR BLD AUTO: 0.7 %
BILIRUB UR QL CFM: NEGATIVE
BUN BLD-MCNC: 10 MG/DL (ref 9–23)
BUN BLD-MCNC: 11 MG/DL (ref 9–23)
BUN BLD-MCNC: 11 MG/DL (ref 9–23)
BUN BLD-MCNC: 7 MG/DL (ref 9–23)
BUN BLD-MCNC: 8 MG/DL (ref 9–23)
BUN BLD-MCNC: 9 MG/DL (ref 9–23)
BUN/CREAT SERPL: 10.2 (ref 10–20)
BUN/CREAT SERPL: 10.9 (ref 10–20)
BUN/CREAT SERPL: 6.9 (ref 10–20)
BUN/CREAT SERPL: 8.7 (ref 10–20)
BUN/CREAT SERPL: 9.6 (ref 10–20)
BUN/CREAT SERPL: 9.9 (ref 10–20)
CALCIUM BLD-MCNC: 8.7 MG/DL (ref 8.7–10.4)
CALCIUM BLD-MCNC: 8.8 MG/DL (ref 8.7–10.4)
CALCIUM BLD-MCNC: 8.8 MG/DL (ref 8.7–10.4)
CALCIUM BLD-MCNC: 8.9 MG/DL (ref 8.7–10.4)
CALCIUM BLD-MCNC: 9.1 MG/DL (ref 8.7–10.4)
CALCIUM BLD-MCNC: 9.1 MG/DL (ref 8.7–10.4)
CHLORIDE SERPL-SCNC: 106 MMOL/L (ref 98–112)
CHLORIDE SERPL-SCNC: 106 MMOL/L (ref 98–112)
CHLORIDE SERPL-SCNC: 107 MMOL/L (ref 98–112)
CHLORIDE SERPL-SCNC: 107 MMOL/L (ref 98–112)
CHLORIDE SERPL-SCNC: 108 MMOL/L (ref 98–112)
CHLORIDE SERPL-SCNC: 109 MMOL/L (ref 98–112)
CLARITY UR: CLEAR
CO2 SERPL-SCNC: 15 MMOL/L (ref 21–32)
CO2 SERPL-SCNC: 19 MMOL/L (ref 21–32)
CO2 SERPL-SCNC: 20 MMOL/L (ref 21–32)
CO2 SERPL-SCNC: 21 MMOL/L (ref 21–32)
CO2 SERPL-SCNC: 22 MMOL/L (ref 21–32)
CO2 SERPL-SCNC: 24 MMOL/L (ref 21–32)
COLOR UR: YELLOW
CREAT BLD-MCNC: 0.92 MG/DL
CREAT BLD-MCNC: 0.94 MG/DL
CREAT BLD-MCNC: 0.98 MG/DL
CREAT BLD-MCNC: 1.01 MG/DL
CREAT BLD-MCNC: 1.02 MG/DL
CREAT BLD-MCNC: 1.11 MG/DL
DEPRECATED RDW RBC AUTO: 40.8 FL (ref 35.1–46.3)
EGFRCR SERPLBLD CKD-EPI 2021: 62 ML/MIN/1.73M2 (ref 60–?)
EGFRCR SERPLBLD CKD-EPI 2021: 69 ML/MIN/1.73M2 (ref 60–?)
EGFRCR SERPLBLD CKD-EPI 2021: 70 ML/MIN/1.73M2 (ref 60–?)
EGFRCR SERPLBLD CKD-EPI 2021: 72 ML/MIN/1.73M2 (ref 60–?)
EGFRCR SERPLBLD CKD-EPI 2021: 76 ML/MIN/1.73M2 (ref 60–?)
EGFRCR SERPLBLD CKD-EPI 2021: 78 ML/MIN/1.73M2 (ref 60–?)
EOSINOPHIL # BLD AUTO: 0.09 X10(3) UL (ref 0–0.7)
EOSINOPHIL NFR BLD AUTO: 1.2 %
ERYTHROCYTE [DISTWIDTH] IN BLOOD BY AUTOMATED COUNT: 13.4 % (ref 11–15)
GLUCOSE BLD-MCNC: 100 MG/DL (ref 70–99)
GLUCOSE BLD-MCNC: 111 MG/DL (ref 70–99)
GLUCOSE BLD-MCNC: 142 MG/DL (ref 70–99)
GLUCOSE BLD-MCNC: 146 MG/DL (ref 70–99)
GLUCOSE BLD-MCNC: 173 MG/DL (ref 70–99)
GLUCOSE BLD-MCNC: 184 MG/DL (ref 70–99)
GLUCOSE BLDC GLUCOMTR-MCNC: 107 MG/DL (ref 70–99)
GLUCOSE BLDC GLUCOMTR-MCNC: 113 MG/DL (ref 70–99)
GLUCOSE BLDC GLUCOMTR-MCNC: 120 MG/DL (ref 70–99)
GLUCOSE BLDC GLUCOMTR-MCNC: 122 MG/DL (ref 70–99)
GLUCOSE BLDC GLUCOMTR-MCNC: 124 MG/DL (ref 70–99)
GLUCOSE BLDC GLUCOMTR-MCNC: 132 MG/DL (ref 70–99)
GLUCOSE BLDC GLUCOMTR-MCNC: 134 MG/DL (ref 70–99)
GLUCOSE BLDC GLUCOMTR-MCNC: 135 MG/DL (ref 70–99)
GLUCOSE BLDC GLUCOMTR-MCNC: 146 MG/DL (ref 70–99)
GLUCOSE BLDC GLUCOMTR-MCNC: 155 MG/DL (ref 70–99)
GLUCOSE BLDC GLUCOMTR-MCNC: 159 MG/DL (ref 70–99)
GLUCOSE BLDC GLUCOMTR-MCNC: 167 MG/DL (ref 70–99)
GLUCOSE BLDC GLUCOMTR-MCNC: 171 MG/DL (ref 70–99)
GLUCOSE BLDC GLUCOMTR-MCNC: 187 MG/DL (ref 70–99)
GLUCOSE BLDC GLUCOMTR-MCNC: 90 MG/DL (ref 70–99)
GLUCOSE BLDC GLUCOMTR-MCNC: 95 MG/DL (ref 70–99)
GLUCOSE UR-MCNC: 100 MG/DL
HCT VFR BLD AUTO: 39.7 %
HGB BLD-MCNC: 13.5 G/DL
HYALINE CASTS #/AREA URNS AUTO: PRESENT /LPF
IMM GRANULOCYTES # BLD AUTO: 0.03 X10(3) UL (ref 0–1)
IMM GRANULOCYTES NFR BLD: 0.4 %
KETONES UR-MCNC: >150 MG/DL
LEUKOCYTE ESTERASE UR QL STRIP.AUTO: NEGATIVE
LYMPHOCYTES # BLD AUTO: 1.79 X10(3) UL (ref 1–4)
LYMPHOCYTES NFR BLD AUTO: 23.6 %
MAGNESIUM SERPL-MCNC: 1.6 MG/DL (ref 1.6–2.6)
MAGNESIUM SERPL-MCNC: 1.7 MG/DL (ref 1.6–2.6)
MAGNESIUM SERPL-MCNC: 1.8 MG/DL (ref 1.6–2.6)
MAGNESIUM SERPL-MCNC: 1.9 MG/DL (ref 1.6–2.6)
MCH RBC QN AUTO: 28.1 PG (ref 26–34)
MCHC RBC AUTO-ENTMCNC: 34 G/DL (ref 31–37)
MCV RBC AUTO: 82.7 FL
MONOCYTES # BLD AUTO: 0.92 X10(3) UL (ref 0.1–1)
MONOCYTES NFR BLD AUTO: 12.1 %
NEUTROPHILS # BLD AUTO: 4.71 X10 (3) UL (ref 1.5–7.7)
NEUTROPHILS # BLD AUTO: 4.71 X10(3) UL (ref 1.5–7.7)
NEUTROPHILS NFR BLD AUTO: 62 %
NITRITE UR QL STRIP.AUTO: NEGATIVE
OSMOLALITY SERPL CALC.SUM OF ELEC: 284 MOSM/KG (ref 275–295)
OSMOLALITY SERPL CALC.SUM OF ELEC: 286 MOSM/KG (ref 275–295)
OSMOLALITY SERPL CALC.SUM OF ELEC: 287 MOSM/KG (ref 275–295)
OSMOLALITY SERPL CALC.SUM OF ELEC: 288 MOSM/KG (ref 275–295)
OSMOLALITY SERPL CALC.SUM OF ELEC: 289 MOSM/KG (ref 275–295)
OSMOLALITY SERPL CALC.SUM OF ELEC: 292 MOSM/KG (ref 275–295)
PH UR: 6 [PH] (ref 5–8)
PHOSPHATE SERPL-MCNC: 1.5 MG/DL (ref 2.4–5.1)
PHOSPHATE SERPL-MCNC: 1.7 MG/DL (ref 2.4–5.1)
PHOSPHATE SERPL-MCNC: 1.8 MG/DL (ref 2.4–5.1)
PHOSPHATE SERPL-MCNC: 2.2 MG/DL (ref 2.4–5.1)
PHOSPHATE SERPL-MCNC: 2.7 MG/DL (ref 2.4–5.1)
PHOSPHATE SERPL-MCNC: 2.8 MG/DL (ref 2.4–5.1)
PLATELET # BLD AUTO: 248 10(3)UL (ref 150–450)
POTASSIUM SERPL-SCNC: 3 MMOL/L (ref 3.5–5.1)
POTASSIUM SERPL-SCNC: 3.3 MMOL/L (ref 3.5–5.1)
POTASSIUM SERPL-SCNC: 3.4 MMOL/L (ref 3.5–5.1)
POTASSIUM SERPL-SCNC: 3.4 MMOL/L (ref 3.5–5.1)
POTASSIUM SERPL-SCNC: 3.5 MMOL/L (ref 3.5–5.1)
POTASSIUM SERPL-SCNC: 3.9 MMOL/L (ref 3.5–5.1)
PROT UR-MCNC: 50 MG/DL
RBC # BLD AUTO: 4.8 X10(6)UL
SODIUM SERPL-SCNC: 137 MMOL/L (ref 136–145)
SODIUM SERPL-SCNC: 138 MMOL/L (ref 136–145)
SODIUM SERPL-SCNC: 138 MMOL/L (ref 136–145)
SODIUM SERPL-SCNC: 139 MMOL/L (ref 136–145)
SP GR UR STRIP: >1.03 (ref 1–1.03)
UROBILINOGEN UR STRIP-ACNC: 2
WBC # BLD AUTO: 7.6 X10(3) UL (ref 4–11)

## 2024-09-16 RX ORDER — MAGNESIUM OXIDE 400 MG/1
400 TABLET ORAL ONCE
Status: COMPLETED | OUTPATIENT
Start: 2024-09-16 | End: 2024-09-16

## 2024-09-16 RX ORDER — DEXTROSE MONOHYDRATE AND SODIUM CHLORIDE 5; .9 G/100ML; G/100ML
INJECTION, SOLUTION INTRAVENOUS CONTINUOUS
Status: DISCONTINUED | OUTPATIENT
Start: 2024-09-16 | End: 2024-09-16

## 2024-09-16 RX ORDER — ACETAMINOPHEN 500 MG
500 TABLET ORAL EVERY 4 HOURS PRN
Qty: 1 TABLET | Refills: 1 | Status: SHIPPED | OUTPATIENT
Start: 2024-09-16

## 2024-09-16 RX ORDER — INSULIN DEGLUDEC 100 U/ML
28 INJECTION, SOLUTION SUBCUTANEOUS DAILY
Status: DISCONTINUED | OUTPATIENT
Start: 2024-09-16 | End: 2024-09-16

## 2024-09-16 RX ORDER — POTASSIUM CHLORIDE 14.9 MG/ML
20 INJECTION INTRAVENOUS ONCE
Status: DISCONTINUED | OUTPATIENT
Start: 2024-09-16 | End: 2024-09-16

## 2024-09-16 NOTE — PROGRESS NOTES
[facility]  part of [facility]    Progress Note    Ira Silva Patient Status:  Inpatient    4/3/1980 MRN [id number]   Location [facility] 2W/SW Attending Dania Cohen MD   Hosp Day # 1 PCP Hailee Mccoy MD       SUBJECTIVE:  Pain.  Denies any pain no fever no chills no other complaints.  Nursing staff reports no complain    OBJECTIVE:  Vital signs in last 24 hours:  /55 (BP Location: Right arm)   Pulse 114   Temp 97.4 °F (36.3 °C) (Temporal)   Resp 19   Ht 5' 7\" (1.702 m)   Wt 206 lb 12.7 oz (93.8 kg)   LMP 09/15/2024 (Approximate)   SpO2 98%   BMI 32.39 kg/m²     Intake/Output:    Intake/Output Summary (Last 24 hours) at 2024 0924  Last data filed at 2024 0400  Gross per 24 hour   Intake 1202 ml   Output --   Net 1202 ml       Wt Readings from Last 3 Encounters:   24 206 lb 12.7 oz (93.8 kg)   23 220 lb (99.8 kg)   22 228 lb 2.8 oz (103.5 kg)       Exam  Gen: No acute distress, alert and oriented x3,  HEENT:   Pulm: Lungs clear, normal respiratory effort  CV: Heart with regular rate and rhythm, no peripheral edema  Abd: Abdomen soft, nontender, nondistended, no organomegaly, bowel sounds present  MSK: Full range of motion in extremities, no clubbing, no cyanosis  Skin: no rashes or lesions  CNS:     Data Review:     Labs:   Lab Results   Component Value Date    WBC 7.6 2024    HGB 13.5 2024    HCT 39.7 2024    .0 2024    CREATSERUM 0.98 2024    BUN 10 2024     2024    K 3.4 2024     2024    CO2 19.0 2024     2024    CA 8.9 2024    MG 1.7 2024    PHOS 1.8 2024       LABS  Recent Labs   Lab 09/15/24  0423 09/15/24  0504 09/15/24  0801 24  0036 24  0605 24  0814   RBC  --  5.01  --   --  4.80  --    HGB  --  13.9  --   --  13.5  --    HCT  --  43.5  --   --  39.7  --    MCV  --  86.8  --   --  82.7  --     MCH  --  27.7  --   --  28.1  --    MCHC  --  32.0  --   --  34.0  --    RDW  --  13.1  --   --  13.4  --    NEPRELIM  --  7.71*  --   --  4.71  --    WBC  --  10.3  --   --  7.6  --    PLT  --  310.0  --   --  248.0  --    AST 14  --   --   --   --   --    ALT 8*  --   --   --   --   --    *  --    < > 146* 184* 173*    < > = values in this interval not displayed.       Imaging:      Meds:   Current Facility-Administered Medications   Medication Dose Route Frequency    dextrose 5%-sodium chloride 0.9% infusion   Intravenous Continuous    glucose (Dex4) 15 GM/59ML oral liquid 15 g  15 g Oral Q15 Min PRN    Or    glucose (Glutose) 40% oral gel 15 g  15 g Oral Q15 Min PRN    Or    glucose-vitamin C (Dex-4) chewable tab 4 tablet  4 tablet Oral Q15 Min PRN    Or    dextrose 50% injection 50 mL  50 mL Intravenous Q15 Min PRN    Or    glucose (Dex4) 15 GM/59ML oral liquid 30 g  30 g Oral Q15 Min PRN    Or    glucose (Glutose) 40% oral gel 30 g  30 g Oral Q15 Min PRN    Or    glucose-vitamin C (Dex-4) chewable tab 8 tablet  8 tablet Oral Q15 Min PRN    insulin regular human (Novolin R, Humulin R) 100 Units in sodium chloride 0.9% 100 mL standard infusion (100 mL)  1-28 Units/hr Intravenous Continuous    heparin (Porcine) 5000 UNIT/ML injection 5,000 Units  5,000 Units Subcutaneous Q8H [facility]    acetaminophen (Tylenol Extra Strength) tab 500 mg  500 mg Oral Q4H PRN    polyethylene glycol (PEG 3350) (Miralax) 17 g oral packet 17 g  17 g Oral Daily PRN    sennosides (Senokot) tab 17.2 mg  17.2 mg Oral Nightly PRN    bisacodyl (Dulcolax) 10 MG rectal suppository 10 mg  10 mg Rectal Daily PRN    fleet enema (Fleet) rectal enema 133 mL  1 enema Rectal Once PRN    ondansetron (Zofran) 4 MG/2ML injection 4 mg  4 mg Intravenous Q6H PRN    prochlorperazine (Compazine) 10 MG/2ML injection 5 mg  5 mg Intravenous Q8H PRN       Assessment  Patient Active Problem List   Diagnosis    Dysuria    Vaginal discharge    Rheumatoid  aortitis    Urinary frequency    Vaginal itching    Vaginitis and vulvovaginitis    Genital herpes    Diabetes (HCC)    Cholelithiasis    Pyelonephritis History of    Essure sterilization    Recurrent vaginitis    Hyperlipidemia    Medical non-compliance    Nontoxic uninodular goiter    Other and unspecified nonspecific immunological findings    Insulin pump titration    Type 1 diabetes mellitus with ketoacidosis without coma (HCC)    Nausea and vomiting in adult    Acute kidney injury (HCC)    Metabolic acidosis   Acute kidney injury (HCC)       Type 1 diabetes mellitus with ketoacidosis without coma (HCC)  Anion gap is closed.  Patient is on insulin.       Metabolic acidosis  Secondary to DKA better       discussed with the nursing staff     Plan:   Continue current treatment await endocrine recommendations   Active Orders   LAB    Basic Metabolic Panel (8)     Frequency: Q4H     Number of Occurrences: Until Specified    Magnesium     Frequency: Q4H     Number of Occurrences: Until Specified    Phosphorus     Frequency: Q4H     Number of Occurrences: Until Specified   Diet    NPO     Frequency: Effective Now     Number of Occurrences: Until Specified   Nursing    Accucheck     Frequency: Q1H     Number of Occurrences: 2 Weeks    Activity as tolerated Until Discontinued     Frequency: Until Discontinued     Number of Occurrences: Until Specified    Cardiac monitoring     Frequency: Continuous     Number of Occurrences: Until Specified    Change Accu-Chek to Q2 hrs when in goal range for 4 hours     Frequency: Until Discontinued     Number of Occurrences: Until Specified     Order Comments: Once within goal range: 110-180 for all patients except for CV surgery patients for 4 hrs can be decreased to every 2 hrs      Discontinue previous insulin orders (basal, NPH correction and all oral diabetes agents) when IV insulin drip is initiated     Frequency: Once     Number of Occurrences: 1 Occurrences    Discontinue  previous insulin orders (basal, NPH correction and all oral diabetes agents) when IV insulin drip is initiated     Frequency: Once     Number of Occurrences: 1 Occurrences    Initiate electrolyte protocol     Frequency: Until Discontinued     Number of Occurrences: Until Specified    Initiate Hypoglycemia Algorithm for Adults     Frequency: Until Discontinued     Number of Occurrences: Until Specified     Order Comments: For blood glucose less than 70      Initiate Hypoglycemia Algorithm for Adults     Frequency: Until Discontinued     Number of Occurrences: Until Specified     Order Comments: For blood glucose less than 70      Initiate Oxygen Protocol     Frequency: Until Discontinued     Number of Occurrences: Until Specified    Insert/Maintain PIV     Frequency: Once     Number of Occurrences: 1 Occurrences    Intake and Output     Frequency: Q Shift     Number of Occurrences: Until Specified    Intermittent Straight Cath Protocol for Acute Urinary Retention     Frequency: Until Discontinued     Number of Occurrences: Until Specified    Notify Physician     Frequency: PRN     Number of Occurrences: Until Specified    Notify Physician if patient refuses SCD's and is not on pharmacologic prophylaxis     Frequency: Until Discontinued     Number of Occurrences: Until Specified    Place sequential compression device     Frequency: Continuous     Number of Occurrences: Until Specified     Order Comments: except while ambulating.      Provide diabetes patient education guide     Frequency: Once     Number of Occurrences: 1 Occurrences     Order Comments: Initiate education for new onset diabetes or pre-existing diabetes with knowledge deficits.      Pulse oximetry     Frequency: Continuous     Number of Occurrences: Until Specified    Remove home glucose monitor - Patient may NOT wear     Frequency: Until Discontinued     Number of Occurrences: Until Specified    Teach blood glucose monitoring with bedside glucometer      Frequency: Once     Number of Occurrences: 1 Occurrences     Order Comments: If patient does not have a home glucometer, notify physician to order for discharge home.      Vital signs     Frequency: Per Unit Routine     Number of Occurrences: 2 Hours     Order Comments: ED holding order only  Cancel if other admission orders exist!        Vital signs     Frequency: Per Unit Routine     Number of Occurrences: Until Specified   Consult    Consult to Diabetes Education     Frequency: Once     Number of Occurrences: 1 Occurrences     Order Comments: DKA      Consult to Endocrinology     Frequency: Once     Number of Occurrences: 1 Occurrences   Medications    acetaminophen (Tylenol Extra Strength) tab 500 mg     Frequency: Q4H PRN     Dose: 500 mg     Route: Oral    bisacodyl (Dulcolax) 10 MG rectal suppository 10 mg     Frequency: Daily PRN     Dose: 10 mg     Route: Rectal    dextrose 5%-sodium chloride 0.9% infusion     Frequency: Continuous     Route: Intravenous    dextrose 50% injection 50 mL     Linked Order: Or     Frequency: Q15 Min PRN     Dose: 50 mL     Route: Intravenous    fleet enema (Fleet) rectal enema 133 mL     Frequency: Once PRN     Dose: 1 enema     Route: Rectal    glucose (Dex4) 15 GM/59ML oral liquid 15 g     Linked Order: Or     Frequency: Q15 Min PRN     Dose: 15 g     Route: Oral    glucose (Dex4) 15 GM/59ML oral liquid 30 g     Linked Order: Or     Frequency: Q15 Min PRN     Dose: 30 g     Route: Oral    glucose (Glutose) 40% oral gel 15 g     Linked Order: Or     Frequency: Q15 Min PRN     Dose: 15 g     Route: Oral    glucose (Glutose) 40% oral gel 30 g     Linked Order: Or     Frequency: Q15 Min PRN     Dose: 30 g     Route: Oral    glucose-vitamin C (Dex-4) chewable tab 4 tablet     Linked Order: Or     Frequency: Q15 Min PRN     Dose: 4 tablet     Route: Oral    glucose-vitamin C (Dex-4) chewable tab 8 tablet     Linked Order: Or     Frequency: Q15 Min PRN     Dose: 8 tablet      Route: Oral    heparin (Porcine) 5000 UNIT/ML injection 5,000 Units     Frequency: Q8H [facility]COM     Dose: 5,000 Units     Route: Subcutaneous    insulin regular human (Novolin R, Humulin R) 100 Units in sodium chloride 0.9% 100 mL standard infusion (100 mL)     Frequency: Continuous     Dose: 1-28 Units/hr     Route: Intravenous    ondansetron (Zofran) 4 MG/2ML injection 4 mg     Frequency: Q6H PRN     Dose: 4 mg     Route: Intravenous    polyethylene glycol (PEG 3350) (Miralax) 17 g oral packet 17 g     Frequency: Daily PRN     Dose: 17 g     Route: Oral    prochlorperazine (Compazine) 10 MG/2ML injection 5 mg     Frequency: Q8H PRN     Dose: 5 mg     Route: Intravenous    sennosides (Senokot) tab 17.2 mg     Frequency: Nightly PRN     Dose: 17.2 mg     Route: Oral         Dania Cohen MD  9/16/2024

## 2024-09-16 NOTE — H&P
[facility]  part of [facility]    History & Physical    Ira Silva Patient Status:  Inpatient    4/3/1980 MRN [id number]   Location [facility] 2W/SW Attending Dania Cohen MD   Hosp Day # 1 PCP Hailee Mccoy MD     Date:  2024  Date of Admission:  9/15/2024    History provided by:patient  Chief Complaint:     Chief Complaint   Patient presents with    Nausea/Vomiting/Diarrhea       HPI:   Ira Sivla is a(n) 44 year old female. Diabetes mellitus type 1, rheumatoid arthritis,,  scleroderma Diabeticorum  who came to the emergency room because of multiple episodes of vomiting.  She describes the vomiting as a watery.  It was nonbilious nonbloody.    Denies any abdominal pain.  No diarrhea.    No fever no chills.    Patient also noted to have very high blood glucose levels.  Patient   was in diabetic ketoacidosis.  Patient started on insulin.    Patient is being admitted to the hospital.    Patient denies any other complaints.    Nursing staff reports no complaints    History     Past Medical History:    Diabetes mellitus (HCC)    IDDM (insulin dependent diabetes mellitus)    RA (rheumatoid arthritis) (HCC)    Type 1 diabetes mellitus (HCC)     Past Surgical History:   Procedure Laterality Date    Anesthesia for;  procedures        10/2012    Hysteroscopy, sterilization      ESSURE     Family History   Problem Relation Age of Onset    High Blood Pressure Father     Blood Disorder Mother         ITP    High Blood Pressure Mother      Social History:  Social History     Socioeconomic History    Marital status:     Number of children: 2   Occupational History    Occupation: HR   Tobacco Use    Smoking status: Never    Smokeless tobacco: Never   Vaping Use    Vaping status: Never Used   Substance and Sexual Activity    Alcohol use: Yes     Comment: social    Drug use: No    Sexual activity: Yes     Partners: Male     Comment: ESSURE DONE 2013    Other Topics Concern    Blood Transfusions Yes     Comment: WITH  10/2012   Social History Narrative    NO HISTORY OF DOMESTIC AND SEXUAL ABUSE      Social Determinants of Health     Financial Resource Strain: Patient Declined (2024)    Received from [facility]    Overall Financial Resource Strain (CARDIA)     Difficulty of Paying Living Expenses: Patient declined   Food Insecurity: Patient Declined (2024)    Received from [facility]    Hunger Vital Sign     Worried About Running Out of Food in the Last Year: Patient declined     Ran Out of Food in the Last Year: Patient declined   Transportation Needs: Patient Declined (2024)    Received from [facility]    PRAPARE - Transportation     Lack of Transportation (Medical): Patient declined     Lack of Transportation (Non-Medical): Patient declined   Housing Stability: Patient Declined (2024)    Received from [facility]    Housing Stability Vital Sign     Unable to Pay for Housing in the Last Year: Patient declined     Unstable Housing in the Last Year: Patient declined     Allergies/Medications:   Allergies: No Known Allergies  Medications Prior to Admission   Medication Sig    hydroxychloroquine 200 MG Oral Tab Take 1 tablet (200 mg total) by mouth daily.    Insulin Degludec (TRESIBA FLEXTOUCH) 100 UNIT/ML Subcutaneous Solution Pen-injector Inject 28 Units into the skin daily.    Continuous Blood Gluc Sensor (DEXCOM G6 SENSOR) Does not apply Misc 1 Application by Does not apply route.    NOVOLOG 100 UNIT/ML Subcutaneous Solution        Review of Systems:   Pertinent items are noted in HPI.    Physical Exam:   Vital Signs:  Blood pressure 124/55, pulse 114, temperature 97.4 °F (36.3 °C), temperature source Temporal, resp. rate 19, height 5' 7\" (1.702 m), weight 206 lb 12.7 oz (93.8 kg), last menstrual period 09/15/2024, SpO2 98%, not currently  breastfeeding.       The patient lying comfortably in bed.  Her friend is in the room with her.    HEENT:  No pallor no icterus extraocular movements are intact.      Neck no JVD no carotid bruit   Heart S1 and S2 regular lungs are clear to auscultation.    Abdomen is soft bowel sounds are present  Extremities no pedal edema.    CNS examination patient is conscious and alert.        Cervical Papanicolaou to be done in MD's office    Results:     Lab Results   Component Value Date    WBC 7.6 09/16/2024    HGB 13.5 09/16/2024    HCT 39.7 09/16/2024    .0 09/16/2024    CREATSERUM 0.98 09/16/2024    BUN 10 09/16/2024     09/16/2024    K 3.4 (L) 09/16/2024     09/16/2024    CO2 19.0 (L) 09/16/2024     (H) 09/16/2024    CA 8.9 09/16/2024    ALB 4.6 09/15/2024    ALKPHO 112 (H) 09/15/2024    BILT 0.5 09/15/2024    TP 8.5 (H) 09/15/2024    AST 14 09/15/2024    ALT 8 (L) 09/15/2024    LIP 46 (L) 12/05/2022    MG 1.7 09/16/2024    PHOS 1.8 (L) 09/16/2024       No results found.        Assessment/Plan:     Acute kidney injury (HCC)  Continue IV fluids      Type 1 diabetes mellitus with ketoacidosis without coma (HCC)   Continue insulin drip      Metabolic acidosis   Continue insulin drip continue IV fluids      discussed with the nursing staff   Discussed with the ER physician.       Active Orders   LAB    Basic Metabolic Panel (8)     Frequency: Q4H     Number of Occurrences: Until Specified    Magnesium     Frequency: Q4H     Number of Occurrences: Until Specified    Phosphorus     Frequency: Q4H     Number of Occurrences: Until Specified   Diet    NPO     Frequency: Effective Now     Number of Occurrences: Until Specified   Nursing    Accucheck     Frequency: Q1H     Number of Occurrences: 2 Weeks    Activity as tolerated Until Discontinued     Frequency: Until Discontinued     Number of Occurrences: Until Specified    Cardiac monitoring     Frequency: Continuous     Number of Occurrences: Until  Specified    Change Accu-Chek to Q2 hrs when in goal range for 4 hours     Frequency: Until Discontinued     Number of Occurrences: Until Specified     Order Comments: Once within goal range: 110-180 for all patients except for CV surgery patients for 4 hrs can be decreased to every 2 hrs      Discontinue previous insulin orders (basal, NPH correction and all oral diabetes agents) when IV insulin drip is initiated     Frequency: Once     Number of Occurrences: 1 Occurrences    Discontinue previous insulin orders (basal, NPH correction and all oral diabetes agents) when IV insulin drip is initiated     Frequency: Once     Number of Occurrences: 1 Occurrences    Initiate electrolyte protocol     Frequency: Until Discontinued     Number of Occurrences: Until Specified    Initiate Hypoglycemia Algorithm for Adults     Frequency: Until Discontinued     Number of Occurrences: Until Specified     Order Comments: For blood glucose less than 70      Initiate Hypoglycemia Algorithm for Adults     Frequency: Until Discontinued     Number of Occurrences: Until Specified     Order Comments: For blood glucose less than 70      Initiate Oxygen Protocol     Frequency: Until Discontinued     Number of Occurrences: Until Specified    Insert/Maintain PIV     Frequency: Once     Number of Occurrences: 1 Occurrences    Intake and Output     Frequency: Q Shift     Number of Occurrences: Until Specified    Intermittent Straight Cath Protocol for Acute Urinary Retention     Frequency: Until Discontinued     Number of Occurrences: Until Specified    Notify Physician     Frequency: PRN     Number of Occurrences: Until Specified    Notify Physician if patient refuses SCD's and is not on pharmacologic prophylaxis     Frequency: Until Discontinued     Number of Occurrences: Until Specified    Place sequential compression device     Frequency: Continuous     Number of Occurrences: Until Specified     Order Comments: except while ambulating.       Provide diabetes patient education guide     Frequency: Once     Number of Occurrences: 1 Occurrences     Order Comments: Initiate education for new onset diabetes or pre-existing diabetes with knowledge deficits.      Pulse oximetry     Frequency: Continuous     Number of Occurrences: Until Specified    Remove home glucose monitor - Patient may NOT wear     Frequency: Until Discontinued     Number of Occurrences: Until Specified    Teach blood glucose monitoring with bedside glucometer     Frequency: Once     Number of Occurrences: 1 Occurrences     Order Comments: If patient does not have a home glucometer, notify physician to order for discharge home.      Vital signs     Frequency: Per Unit Routine     Number of Occurrences: 2 Hours     Order Comments: ED holding order only  Cancel if other admission orders exist!        Vital signs     Frequency: Per Unit Routine     Number of Occurrences: Until Specified   Consult    Consult to Diabetes Education     Frequency: Once     Number of Occurrences: 1 Occurrences     Order Comments: DKA      Consult to Endocrinology     Frequency: Once     Number of Occurrences: 1 Occurrences   Medications    acetaminophen (Tylenol Extra Strength) tab 500 mg     Frequency: Q4H PRN     Dose: 500 mg     Route: Oral    bisacodyl (Dulcolax) 10 MG rectal suppository 10 mg     Frequency: Daily PRN     Dose: 10 mg     Route: Rectal    dextrose 5%-sodium chloride 0.9% infusion     Frequency: Continuous     Route: Intravenous    dextrose 50% injection 50 mL     Linked Order: Or     Frequency: Q15 Min PRN     Dose: 50 mL     Route: Intravenous    fleet enema (Fleet) rectal enema 133 mL     Frequency: Once PRN     Dose: 1 enema     Route: Rectal    glucose (Dex4) 15 GM/59ML oral liquid 15 g     Linked Order: Or     Frequency: Q15 Min PRN     Dose: 15 g     Route: Oral    glucose (Dex4) 15 GM/59ML oral liquid 30 g     Linked Order: Or     Frequency: Q15 Min PRN     Dose: 30 g     Route: Oral     glucose (Glutose) 40% oral gel 15 g     Linked Order: Or     Frequency: Q15 Min PRN     Dose: 15 g     Route: Oral    glucose (Glutose) 40% oral gel 30 g     Linked Order: Or     Frequency: Q15 Min PRN     Dose: 30 g     Route: Oral    glucose-vitamin C (Dex-4) chewable tab 4 tablet     Linked Order: Or     Frequency: Q15 Min PRN     Dose: 4 tablet     Route: Oral    glucose-vitamin C (Dex-4) chewable tab 8 tablet     Linked Order: Or     Frequency: Q15 Min PRN     Dose: 8 tablet     Route: Oral    heparin (Porcine) 5000 UNIT/ML injection 5,000 Units     Frequency: Q8H MALCOM     Dose: 5,000 Units     Route: Subcutaneous    insulin regular human (Novolin R, Humulin R) 100 Units in sodium chloride 0.9% 100 mL standard infusion (100 mL)     Frequency: Continuous     Dose: 1-28 Units/hr     Route: Intravenous    ondansetron (Zofran) 4 MG/2ML injection 4 mg     Frequency: Q6H PRN     Dose: 4 mg     Route: Intravenous    polyethylene glycol (PEG 3350) (Miralax) 17 g oral packet 17 g     Frequency: Daily PRN     Dose: 17 g     Route: Oral    prochlorperazine (Compazine) 10 MG/2ML injection 5 mg     Frequency: Q8H PRN     Dose: 5 mg     Route: Intravenous    sennosides (Senokot) tab 17.2 mg     Frequency: Nightly PRN     Dose: 17.2 mg     Route: Oral       Dania Cohen MD  9/16/2024

## 2024-09-16 NOTE — PAYOR COMM NOTE
--------------  ADMISSION REVIEW     Payor: ENRIQUE GONZALEZ  Subscriber #:  [id number]  Authorization Number: [id number]    Admit date: 9/15/24  Admit time:  5:53 PM     Patient Seen in: [facility] Emergency Department    History   Stated Complaint: N/V/D    Patient is a 44-year-old female who presents with vomiting since 12 PM.  No diarrhea, fevers or abdominal pain.  Denies any dizziness but feels weak.  States her glucoses frequently run high.  Denies any recent sick contacts or bad food intake.    Objective:   Past Medical History:    Diabetes mellitus (HCC)    IDDM (insulin dependent diabetes mellitus)    RA (rheumatoid arthritis) (HCC)    Type 1 diabetes mellitus (HCC)     Past Surgical History:   Procedure Laterality Date    Anesthesia for;  procedures        10/2012    Hysteroscopy, sterilization      ESSURE     Physical Exam     ED Triage Vitals [09/15/24 0343]   /69   Pulse 117   Resp 20   Temp 98.2 °F (36.8 °C)   Temp src Oral   SpO2 99 %   O2 Device None (Room air)     Current Vitals:   Vital Signs  BP: 118/63  Pulse: 116  Resp: 24  Temp: 98.2 °F (36.8 °C)  Temp src: Oral  MAP (mmHg): 81    Physical Exam    GENERAL: some distress, awake and alert  HEENT: EOMI, PERRL, MM dry  Neck: supple  CV: tachycardic, no murmurs  Resp: CTAB, no wheezes or retractions  Ab: soft, nontender, no distension  Extremities: FROM of all extremities  Neuro: CN intact, normal speech, normal gait, 5/5 motor strength in all extremities, no focal deficits  SKIN: warm, dry, no rashes    Labs Reviewed   BASIC METABOLIC PANEL (8) - Abnormal; Notable for the following components:       Result Value    Glucose 333 (*)     Sodium 131 (*)     CO2 12.0 (*)     Anion Gap 21 (*)     Creatinine 1.46 (*)     eGFR-Cr 45 (*)     All other components within normal limits   HEPATIC FUNCTION PANEL (7) - Abnormal; Notable for the following components:    ALT 8 (*)     Alkaline Phosphatase 112 (*)     Total Protein 8.5 (*)      All other components within normal limits   ACETONE - Abnormal; Notable for the following components:    Acetone Small (*)     All other components within normal limits   ARTERIAL BLOOD GAS - Abnormal; Notable for the following components:    ABG pH 7.17 (*)     ABG pCO2 24 (*)     ABG PO2 71 (*)     ABG HCO3 10.7 (*)     Blood Gas Base Excess -17.9 (*)     ABG O2 Saturation 93.5 (*)     All other components within normal limits   POCT GLUCOSE - Abnormal; Notable for the following components:    POC Glucose  326 (*)     All other components within normal limits   CBC WITH DIFFERENTIAL WITH PLATELET   URINALYSIS WITH CULTURE REFLEX   MAGNESIUM     Medical Decision Making  Ddx: gastroenteritis, dehydration, DKA, electrolyte abnormality    Ivf/zofran  Pt notified of results.  Labs remarkable for elevated glucose, metabolic acidosis. Pt started on insulin drip.   Discussion of management or test interpretation with external provider(s): D/w dr Noel Platt notified    Disposition and Plan     Clinical Impression:  1. Acute kidney injury (HCC)    2. Type 1 diabetes mellitus with ketoacidosis without coma (HCC)    3. Metabolic acidosis         History and Physical     Ira Silva is a(n) 44 year old female. Diabetes mellitus type 1, rheumatoid arthritis,,  scleroderma Diabeticorum  who came to the emergency room because of multiple episodes of vomiting.  She describes the vomiting as a watery.  It was nonbilious nonbloody.    Denies any abdominal pain.  No diarrhea.    No fever no chills.    Patient also noted to have very high blood glucose levels.  Patient   was in diabetic ketoacidosis.  Patient started on insulin.       Physical Exam:   Vital Signs:  Blood pressure 124/55, pulse 114, temperature 97.4 °F (36.3 °C), temperature source Temporal, resp. rate 19, height 5' 7\" (1.702 m), weight 206 lb 12.7 oz (93.8 kg), last menstrual period 09/15/2024, SpO2 98%, not currently breastfeeding.      The patient  lying in bed.  Her friend is in the room with her.    HEENT:  No pallor no icterus extraocular movements are intact.       Neck no JVD no carotid bruit   Heart S1 and S2 regular lungs are clear to auscultation.    Abdomen is soft bowel sounds are present  Extremities no pedal edema.    CNS examination patient is conscious and alert.        Lab Results   Component Value Date     WBC 7.6 09/16/2024     HGB 13.5 09/16/2024     HCT 39.7 09/16/2024     .0 09/16/2024     CREATSERUM 0.98 09/16/2024     BUN 10 09/16/2024      09/16/2024     K 3.4 (L) 09/16/2024      09/16/2024     CO2 19.0 (L) 09/16/2024      (H) 09/16/2024     CA 8.9 09/16/2024     ALB 4.6 09/15/2024     ALKPHO 112 (H) 09/15/2024     BILT 0.5 09/15/2024     TP 8.5 (H) 09/15/2024     AST 14 09/15/2024     ALT 8 (L) 09/15/2024       Assessment/Plan:     Acute kidney injury (HCC)  Continue IV fluids       Type 1 diabetes mellitus with ketoacidosis without coma (HCC)   Continue insulin drip       Metabolic acidosis   Continue insulin drip continue IV fluids     ADMIT TO ICU:    Metabolic acidosis     Plan:  Insulin drip protocol  Aggressive IVF  NPO with sips of clears   Endocrine consult  Electrolyte replacement as needed  Repeat ABG in am     Admission orders completed      Catalina Glass NP  [facility]       MEDICATIONS ADMINISTERED IN LAST 1 DAY:  dextrose 5%-sodium chloride 0.9% infusion       Date Action Dose Route User    9/16/2024 0107 New Bag (none) Intravenous Brooklyn Wilson, RN          heparin (Porcine) 5000 UNIT/ML injection 5,000 Units       Date Action Dose Route User    9/16/2024 0500 Given 5,000 Units Subcutaneous (Right Lower Abdomen) Brooklyn Wilson RN    9/15/2024 2100 Given 5,000 Units Subcutaneous (Left Lower Abdomen) Brooklyn Wilson, RN          insulin regular human (Novolin R, Humulin R) 100 Units in sodium chloride 0.9% 100 mL standard infusion (100 mL)       Date Action Dose Route User    9/15/2024 1700  Rate/Dose Change 1 Units/hr Intravenous Renita Sahni, RN    9/15/2024 1500 Rate/Dose Change 1 Units/hr Intravenous Kaia Randle, RN    9/15/2024 1316 Rate/Dose Change 1.5 Units/hr Intravenous Kaia Randle, RN          insulin regular human (Novolin R, Humulin R) 100 Units in sodium chloride 0.9% 100 mL standard infusion (100 mL)       Date Action Dose Route User    9/16/2024 1000 Rate/Dose Change 1.5 Units/hr Intravenous Tanya Frost, RN    9/16/2024 0900 Rate/Dose Verify 2 Units/hr Intravenous Tanya Frost, RN    9/16/2024 0800 Rate/Dose Verify 2 Units/hr Intravenous Tanya Frost, RN    9/16/2024 0700 Rate/Dose Change 2 Units/hr Intravenous Brooklyn Wilson, RN    9/16/2024 0610 Rate/Dose Change 3 Units/hr Intravenous Brooklyn Wilson, RN    9/16/2024 0500 Rate/Dose Change 1.5 Units/hr Intravenous Brooklyn Wilson, RN    9/16/2024 0405 Rate/Dose Verify 1 Units/hr Intravenous Brooklyn Wilson, RN    9/16/2024 0305 Rate/Dose Verify 1 Units/hr Intravenous Brooklyn Wilson, RN    9/16/2024 0200 Rate/Dose Verify 1 Units/hr Intravenous Brooklyn Wilson, RN    9/16/2024 0107 Rate/Dose Verify 1 Units/hr Intravenous Brooklyn Wilson, RN    9/16/2024 0002 Rate/Dose Verify 1 Units/hr Intravenous Brooklyn Wilson, RN    9/15/2024 2259 Rate/Dose Change 1 Units/hr Intravenous Brooklyn Wilson, RN    9/15/2024 2204 Rate/Dose Change 0.5 Units/hr Intravenous Brooklyn Wilson, RN    9/15/2024 2100 Rate/Dose Verify 1 Units/hr Intravenous Brooklyn Wilson, RN    9/15/2024 2000 Rate/Dose Verify 1 Units/hr Intravenous Brooklyn Wilson, RN    9/15/2024 1900 Rate/Dose Change 1 Units/hr Intravenous Kadi Chopra, RN    9/15/2024 1805 New Bag 0.5 Units/hr Intravenous Kadi Chopra, RN          magnesium sulfate in sterile water for injection 2 g/50mL IVPB premix 2 g       Date Action Dose Route User    9/15/2024 1848 New Bag 2 g Intravenous Kadi Chopra, RN          ondansetron  (Zofran) 4 MG/2ML injection 4 mg       Date Action Dose Route User    9/15/2024 1817 Given 4 mg Intravenous Kadi Chopra, FAINA          sodium chloride 0.9% infusion       Date Action Dose Route User    9/15/2024 1812 New Bag (none) Intravenous Kadi Chopra RN          sodium phosphate 15 mmol in 0.9% NaCl 100mL IVPB premix       Date Action Dose Route User    9/15/2024 2213 Given 15 mmol Intravenous Brooklyn Wilson RN            Vitals (last day)       Date/Time Temp Pulse Resp BP SpO2 Weight O2 Device O2 Flow Rate (L/min) [facility]    09/16/24 0800 97.4 °F (36.3 °C) 114 19 124/55 98 % -- None (Room air) --     09/16/24 0600 -- 111 18 143/75 100 % -- None (Room air) --     09/16/24 0400 98.9 °F (37.2 °C) 112 19 121/61 100 % -- None (Room air) --     09/16/24 0200 -- 112 21 113/53 100 % -- None (Room air) --     09/16/24 0000 97.4 °F (36.3 °C) 111 17 128/58 100 % 206 lb 12.7 oz (93.8 kg) None (Room air) --     09/15/24 2200 -- 120 19 119/57 100 % -- None (Room air) --     09/15/24 2000 98.2 °F (36.8 °C) 112 19 127/75 100 % -- None (Room air) --     09/15/24 1800 98.9 °F (37.2 °C) 110 18 118/73 -- 206 lb 12.8 oz (93.8 kg) None (Room air) -- LM    09/15/24 1730 -- 118 22 124/85 -- -- None (Room air) -- RR    09/15/24 1500 -- 116 16 102/63 100 % -- None (Room air) -- RR    09/15/24 1430 -- 116 16 110/73 100 % -- None (Room air) -- RR    09/15/24 1400 -- 113 19 129/77 100 % -- None (Room air) -- RR    09/15/24 1245 -- 116 25 100/56 100 % -- None (Room air) -- RR    09/15/24 0730 -- 120 19 126/71 99 % -- None (Room air) -- RR    09/15/24 0630 -- 115 24 127/74 99 % -- None (Room air) -- ER    09/15/24 0600 -- 117 23 114/67 99 % -- None (Room air) -- ER    09/15/24 0530 -- 115 24 128/74 99 % -- -- -- AB    09/15/24 0430 -- 116 24 118/63 100 % -- None (Room air) -- ER    09/15/24 0400 -- 113 23 127/79 100 % -- None (Room air) -- ER    09/15/24 0343 98.2 °F (36.8 °C) 117 20 108/69 99 % 225 lb (102.1 kg) None  (Room air) -- JR

## 2024-09-16 NOTE — PLAN OF CARE
Problem: Diabetes/Glucose Control  Goal: Glucose maintained within prescribed range  Description: INTERVENTIONS:  - Monitor Blood Glucose as ordered  - Assess for signs and symptoms of hyperglycemia and hypoglycemia  - Administer ordered medications to maintain glucose within target range  - Assess barriers to adequate nutritional intake and initiate nutrition consult as needed  - Instruct patient on self management of diabetes  Outcome: Progressing     Problem: GASTROINTESTINAL - ADULT  Goal: Minimal or absence of nausea and vomiting  Description: INTERVENTIONS:  - Maintain adequate hydration with IV or PO as ordered and tolerated  - Nasogastric tube to low intermittent suction as ordered  - Evaluate effectiveness of ordered antiemetic medications  - Provide nonpharmacologic comfort measures as appropriate  - Advance diet as tolerated, if ordered  - Obtain nutritional consult as needed  - Evaluate fluid balance  Outcome: Progressing

## 2024-09-16 NOTE — DIABETES ED
[facility]   Diabetes Education Note    Ira Silva  Patient Status Inpatient    4/3/1980  Location [facility] 2W/SW  Attending Dania Cohen MD  Hospital Day # 1  PCP  Hailee Mccoy MD      Reason for Visit:  Diabetic Ketoacidosis and elevated A1c value.        Labs:    HgbA1C (%)   Date Value   09/15/2024 14.5 (H)       Discussion:  Patient reports she has not been admitted to a hospital for DKA for several years. She is familiar with signs and symptoms of DKA. She wears a CGM device to assist with glucose control. She feels she omitted her long acting insulin prior to hospitalization due to fatigue, experienced nausea and vomiting and arrived at the ER later.    We discussed her current A1c values and she states she does not adhere to medication routine.  She states she takes her long acting insulin  however only administers rapid acting insulin 1 times per day.  She states she consumes 3 meals per day and has been directed to follow a ICR of 1:9.  She is aware of glucose control to prevent and delay complications from diabetes. She has achieved a lower A1c value previously however due to schedule she omits her rapid acting insulin.    Provided her with carbohydrate counting and DKA handouts and encouraged her to review.    She states she has recently established care with Endocrinologist david BENZ and I encouraged her to contact office to schedule appointment after discharge from hospitalization.    She would benefit from outpatient diabetes education and recommend she discuss with outpatient medical team.    Education provided:  Diagnosis of DKA  Causes of DKA  Warning Signs of DKA  Urine Ketone monitoring  Sick Day Management     Patient verbalized understanding, was appreciative of the visit.    Adrianna Lay RN  2024  4:53 PM

## 2024-09-16 NOTE — DISCHARGE INSTRUCTIONS
Diabetes:  Your A1C level is greater than 8%.  It is recommended that you attend an outpatient diabetes education program.  Please discuss with your Primary Care Provider at your next visit to obtain a referral.  If you wish to make an appointment at [facility], call [phone].

## 2024-09-17 NOTE — PROGRESS NOTES
Discharge instructions reviewed with the patient. All questions answered. She is aware to follow up with her endocrinologist  @ [facility] ASAP.

## 2024-09-17 NOTE — PROGRESS NOTES
Ok to discharge home per endocrinology. Patient to go home on home diabetes meds per endocrinology.    Discharge orders confirmed with Dr. Cohen (telephone orders repeated back)

## 2024-09-17 NOTE — PAYOR COMM NOTE
--------------  DISCHARGE REVIEW    Payor: ENRIQUE GONZALEZ  Subscriber #:  [id number]  Authorization Number: [id number]    Admit date: 9/15/24  Admit time:   5:53 PM  Discharge Date: 9/16/2024  8:54 PM        Discharge Summary Notes    No notes of this type exist for this encounter.         HOME

## 2025-01-25 ENCOUNTER — HOSPITAL ENCOUNTER (EMERGENCY)
Facility: HOSPITAL | Age: 45
Discharge: HOME | End: 2025-01-25
Attending: EMERGENCY MEDICINE
Payer: COMMERCIAL

## 2025-01-25 VITALS
BODY MASS INDEX: 34 KG/M2 | DIASTOLIC BLOOD PRESSURE: 68 MMHG | RESPIRATION RATE: 18 BRPM | TEMPERATURE: 97 F | HEART RATE: 113 BPM | SYSTOLIC BLOOD PRESSURE: 133 MMHG | WEIGHT: 220 LBS | OXYGEN SATURATION: 100 %

## 2025-01-25 DIAGNOSIS — R19.7 NAUSEA VOMITING AND DIARRHEA: Primary | ICD-10-CM

## 2025-01-25 DIAGNOSIS — R11.2 NAUSEA VOMITING AND DIARRHEA: Primary | ICD-10-CM

## 2025-01-25 LAB
ANION GAP SERPL CALC-SCNC: 12 MMOL/L (ref 0–18)
BASOPHILS # BLD AUTO: 0.01 X10(3) UL (ref 0–0.2)
BASOPHILS NFR BLD AUTO: 0.1 %
BUN BLD-MCNC: 14 MG/DL (ref 9–23)
BUN/CREAT SERPL: 15.2 (ref 10–20)
CALCIUM BLD-MCNC: 10 MG/DL (ref 8.7–10.4)
CHLORIDE SERPL-SCNC: 105 MMOL/L (ref 98–112)
CO2 SERPL-SCNC: 23 MMOL/L (ref 21–32)
CREAT BLD-MCNC: 0.92 MG/DL
DEPRECATED RDW RBC AUTO: 38.8 FL (ref 35.1–46.3)
EGFRCR SERPLBLD CKD-EPI 2021: 78 ML/MIN/1.73M2 (ref 60–?)
EOSINOPHIL # BLD AUTO: 0.02 X10(3) UL (ref 0–0.7)
EOSINOPHIL NFR BLD AUTO: 0.2 %
ERYTHROCYTE [DISTWIDTH] IN BLOOD BY AUTOMATED COUNT: 12.9 % (ref 11–15)
GLUCOSE BLD-MCNC: 269 MG/DL (ref 70–99)
GLUCOSE BLDC GLUCOMTR-MCNC: 236 MG/DL (ref 70–99)
HCT VFR BLD AUTO: 42.3 %
HGB BLD-MCNC: 13.7 G/DL
IMM GRANULOCYTES # BLD AUTO: 0.03 X10(3) UL (ref 0–1)
IMM GRANULOCYTES NFR BLD: 0.4 %
LYMPHOCYTES # BLD AUTO: 0.73 X10(3) UL (ref 1–4)
LYMPHOCYTES NFR BLD AUTO: 9.1 %
MCH RBC QN AUTO: 26.7 PG (ref 26–34)
MCHC RBC AUTO-ENTMCNC: 32.4 G/DL (ref 31–37)
MCV RBC AUTO: 82.5 FL
MONOCYTES # BLD AUTO: 0.44 X10(3) UL (ref 0.1–1)
MONOCYTES NFR BLD AUTO: 5.5 %
NEUTROPHILS # BLD AUTO: 6.8 X10 (3) UL (ref 1.5–7.7)
NEUTROPHILS # BLD AUTO: 6.8 X10(3) UL (ref 1.5–7.7)
NEUTROPHILS NFR BLD AUTO: 84.7 %
OSMOLALITY SERPL CALC.SUM OF ELEC: 300 MOSM/KG (ref 275–295)
PLATELET # BLD AUTO: 296 10(3)UL (ref 150–450)
POTASSIUM SERPL-SCNC: 4.3 MMOL/L (ref 3.5–5.1)
RBC # BLD AUTO: 5.13 X10(6)UL
SODIUM SERPL-SCNC: 140 MMOL/L (ref 136–145)
WBC # BLD AUTO: 8 X10(3) UL (ref 4–11)

## 2025-01-25 PROCEDURE — 82962 GLUCOSE BLOOD TEST: CPT

## 2025-01-25 PROCEDURE — 99284 EMERGENCY DEPT VISIT MOD MDM: CPT

## 2025-01-25 PROCEDURE — 80048 BASIC METABOLIC PNL TOTAL CA: CPT | Performed by: EMERGENCY MEDICINE

## 2025-01-25 PROCEDURE — 85025 COMPLETE CBC W/AUTO DIFF WBC: CPT | Performed by: EMERGENCY MEDICINE

## 2025-01-25 PROCEDURE — 96360 HYDRATION IV INFUSION INIT: CPT

## 2025-01-25 PROCEDURE — 85025 COMPLETE CBC W/AUTO DIFF WBC: CPT

## 2025-01-25 PROCEDURE — 80048 BASIC METABOLIC PNL TOTAL CA: CPT

## 2025-01-25 PROCEDURE — 96361 HYDRATE IV INFUSION ADD-ON: CPT

## 2025-01-25 RX ORDER — ONDANSETRON 4 MG/1
4 TABLET, ORALLY DISINTEGRATING ORAL EVERY 8 HOURS PRN
Qty: 10 TABLET | Refills: 0 | Status: SHIPPED | OUTPATIENT
Start: 2025-01-25 | End: 2025-02-01

## 2025-01-25 NOTE — ED INITIAL ASSESSMENT (HPI)
Patient complains of nausea vomiting and diarrhea since yesterday. Is a type 1 diabetic, noted BG read \"High\" last night, took novolog 10 unite x 90 mins ago.

## 2025-01-25 NOTE — DISCHARGE INSTRUCTIONS
Zofran as needed for nausea. Drink plenty of fluids. Return for worse condition. CHeck your sugars frequently over the next 2 days.

## 2025-01-25 NOTE — ED PROVIDER NOTES
Patient Seen in: [facility] Emergency Department      History     Chief Complaint   Patient presents with    Nausea/Vomiting/Diarrhea    Hyperglycemia     Stated Complaint: elevated glucose    Subjective:   HPI      Patient presents the emergency department with high blood sugar, nausea vomiting and diarrhea.  She states that at 10:00 last night she started feeling ill.  She had several episodes of vomiting and diarrhea which continued up until her arrival here.  She states that she had an IV started in triage and feels much better.  There is no more nausea.  She denies fever or chills.  There is no abdominal pain.  There is no aggravating or alleviating factors.    Objective:     Past Medical History:    Diabetes mellitus (HCC)    IDDM (insulin dependent diabetes mellitus)    RA (rheumatoid arthritis) (HCC)    Type 1 diabetes mellitus (HCC)              Past Surgical History:   Procedure Laterality Date    Anesthesia for;  procedures        10/2012    Hysteroscopy, sterilization      ESSURE                Social History     Socioeconomic History    Marital status:     Number of children: 2   Occupational History    Occupation: HR   Tobacco Use    Smoking status: Never    Smokeless tobacco: Never   Vaping Use    Vaping status: Never Used   Substance and Sexual Activity    Alcohol use: Yes     Comment: social    Drug use: No    Sexual activity: Yes     Partners: Male     Comment: ESSURE DONE 2013   Other Topics Concern    Blood Transfusions Yes     Comment: WITH  10/2012   Social History Narrative    NO HISTORY OF DOMESTIC AND SEXUAL ABUSE      Social Drivers of Health     Financial Resource Strain: Patient Declined (2024)    Received from [facility]    Overall Financial Resource Strain (CARDIA)     Difficulty of Paying Living Expenses: Patient declined   Food Insecurity: Patient Declined (2024)    Received from [facility]     Hunger Vital Sign     Worried About Running Out of Food in the Last Year: Patient declined     Ran Out of Food in the Last Year: Patient declined   Transportation Needs: Patient Declined (5/14/2024)    Received from [facility]    PRAPARE - Transportation     Lack of Transportation (Medical): Patient declined     Lack of Transportation (Non-Medical): Patient declined   Housing Stability: Patient Declined (5/14/2024)    Received from [facility]    Housing Stability Vital Sign     Unable to Pay for Housing in the Last Year: Patient declined     Unstable Housing in the Last Year: Patient declined                  Physical Exam     ED Triage Vitals [01/25/25 1202]   /77   Pulse 116   Resp 18   Temp 97.3 °F (36.3 °C)   Temp src Temporal   SpO2 100 %   O2 Device None (Room air)       Current Vitals:   No data recorded      Physical Exam  Vitals and nursing note reviewed.   Constitutional:       General: She is not in acute distress.     Appearance: She is well-developed.   HENT:      Head: Normocephalic.      Nose: Nose normal.      Mouth/Throat:      Mouth: Mucous membranes are moist.   Eyes:      Conjunctiva/sclera: Conjunctivae normal.   Cardiovascular:      Rate and Rhythm: Normal rate and regular rhythm.      Heart sounds: No murmur heard.  Pulmonary:      Effort: Pulmonary effort is normal. No respiratory distress.      Breath sounds: Normal breath sounds.   Abdominal:      General: There is no distension.      Palpations: Abdomen is soft.   Musculoskeletal:         General: No tenderness. Normal range of motion.      Cervical back: Normal range of motion and neck supple.   Skin:     General: Skin is warm and dry.      Findings: No rash.   Neurological:      Mental Status: She is alert and oriented to person, place, and time.             ED Course     Labs Reviewed   CBC WITH DIFFERENTIAL WITH PLATELET - Abnormal; Notable for the following components:       Result  Value    Lymphocyte Absolute 0.73 (*)     All other components within normal limits   BASIC METABOLIC PANEL (8) - Abnormal; Notable for the following components:    Glucose 269 (*)     Calculated Osmolality 300 (*)     All other components within normal limits   POCT GLUCOSE - Abnormal; Notable for the following components:    POC Glucose  236 (*)     All other components within normal limits                   MDM              Medical Decision Making  Differential diagnosis considered for food poisoning, viral illness, electrolyte disturbance, dehydration.    Problems Addressed:  Nausea vomiting and diarrhea: acute illness or injury    Amount and/or Complexity of Data Reviewed  Labs: ordered. Decision-making details documented in ED Course.     Details: Mild dehydration based on laboratory and urine specimen.  Discussion of management or test interpretation with external provider(s): Patient received 2 L of fluid and feels much improved.  No nausea, no further diarrhea, no abdominal pain.  Follow-up with primary care physician.  Symptomatic therapy with Zofran and clear liquid diet.    Risk  Prescription drug management.        Disposition and Plan     Clinical Impression:  1. Nausea vomiting and diarrhea         Disposition:  Discharge  1/25/2025  2:47 pm    Follow-up:  Hailee Mccoy MD  [address] [place] [id number]  [phone]    Schedule an appointment as soon as possible for a visit            Medications Prescribed:  Discharge Medication List as of 1/25/2025  2:48 PM        START taking these medications    Details   ondansetron 4 MG Oral Tablet Dispersible Take 1 tablet (4 mg total) by mouth every 8 (eight) hours as needed., Normal, Disp-10 tablet, R-0                 Supplementary Documentation:

## 2025-05-01 ENCOUNTER — OFFICE VISIT (OUTPATIENT)
Dept: OBGYN CLINIC | Facility: CLINIC | Age: 45
End: 2025-05-01
Payer: COMMERCIAL

## 2025-05-01 ENCOUNTER — LAB ENCOUNTER (OUTPATIENT)
Dept: LAB | Facility: HOSPITAL | Age: 45
End: 2025-05-01
Attending: OBSTETRICS & GYNECOLOGY
Payer: COMMERCIAL

## 2025-05-01 VITALS
HEIGHT: 67 IN | SYSTOLIC BLOOD PRESSURE: 131 MMHG | BODY MASS INDEX: 36.41 KG/M2 | WEIGHT: 232 LBS | DIASTOLIC BLOOD PRESSURE: 78 MMHG

## 2025-05-01 DIAGNOSIS — Z11.3 SCREENING EXAMINATION FOR STD (SEXUALLY TRANSMITTED DISEASE): ICD-10-CM

## 2025-05-01 DIAGNOSIS — Z01.419 WELL WOMAN EXAM WITH ROUTINE GYNECOLOGICAL EXAM: Primary | ICD-10-CM

## 2025-05-01 LAB
HBV SURFACE AG SER-ACNC: <0.1 [IU]/L
HBV SURFACE AG SERPL QL IA: NONREACTIVE
HCV AB SERPL QL IA: NONREACTIVE
T PALLIDUM AB SER QL IA: NONREACTIVE

## 2025-05-01 PROCEDURE — 87340 HEPATITIS B SURFACE AG IA: CPT

## 2025-05-01 PROCEDURE — 3075F SYST BP GE 130 - 139MM HG: CPT | Performed by: OBSTETRICS & GYNECOLOGY

## 2025-05-01 PROCEDURE — 86780 TREPONEMA PALLIDUM: CPT

## 2025-05-01 PROCEDURE — 36415 COLL VENOUS BLD VENIPUNCTURE: CPT

## 2025-05-01 PROCEDURE — 86803 HEPATITIS C AB TEST: CPT

## 2025-05-01 PROCEDURE — 99386 PREV VISIT NEW AGE 40-64: CPT | Performed by: OBSTETRICS & GYNECOLOGY

## 2025-05-01 PROCEDURE — 3008F BODY MASS INDEX DOCD: CPT | Performed by: OBSTETRICS & GYNECOLOGY

## 2025-05-01 PROCEDURE — 87389 HIV-1 AG W/HIV-1&-2 AB AG IA: CPT

## 2025-05-01 PROCEDURE — 3078F DIAST BP <80 MM HG: CPT | Performed by: OBSTETRICS & GYNECOLOGY

## 2025-05-01 RX ORDER — PHENTERMINE HYDROCHLORIDE 30 MG/1
30 CAPSULE ORAL DAILY
COMMUNITY
Start: 2024-10-09

## 2025-05-01 RX ORDER — TOPIRAMATE 25 MG/1
25 TABLET, FILM COATED ORAL DAILY
COMMUNITY

## 2025-05-01 NOTE — PROGRESS NOTES
HPI:   Ira Silva is a 45 year old female who presents for an annual/pap     Wt Readings from Last 6 Encounters:   05/01/25 232 lb (105.2 kg)   01/25/25 220 lb (99.8 kg)   09/16/24 206 lb 12.7 oz (93.8 kg)   09/04/23 220 lb (99.8 kg)   12/05/22 228 lb 2.8 oz (103.5 kg)   08/01/22 240 lb (108.9 kg)     Body mass index is 36.34 kg/m².    AST (U/L)   Date Value   09/15/2024 14   09/04/2023 10 (L)   12/05/2022 23     ALT (U/L)   Date Value   09/15/2024 8 (L)   09/04/2023 10 (L)   12/05/2022 24        Current Medications[1]   Past Medical History[2]   Past Surgical History[3]   Family History[4]   Social History:   Short Social Hx on File[5]         REVIEW OF SYSTEMS:   GENERAL: feels well otherwise  SKIN: denies any unusual skin lesions  EYES:denies blurred vision or double vision  HEENT: denies nasal congestion, sinus pain or ST  LUNGS: denies shortness of breath with exertion  CARDIOVASCULAR: denies chest pain on exertion  GI: denies abdominal pain,denies heartburn  : denies dysuria, vaginal discharge or itching,periods regular   MUSCULOSKELETAL: denies back pain  NEURO: denies headaches  PSYCHE: denies depression or anxiety  HEMATOLOGIC: denies hx of anemia  ENDOCRINE: denies thyroid history  ALL/ASTHMA: denies hx of allergy or asthma    EXAM:   Ht 5' 7\" (1.702 m)   Wt 232 lb (105.2 kg)   LMP 01/23/2025 (Exact Date)   BMI 36.34 kg/m²   Body mass index is 36.34 kg/m².   GENERAL: well developed, well nourished,in no apparent distress  SKIN: no rashes,no suspicious lesions  HEENT: atraumatic, normocephalic  EYES:normal in appearance  NECK: supple,no adenopathy  CHEST: no chest tenderness  BREAST: no dominant or suspicious mass  LUNGS: clear to auscultation  CARDIO: RRR without murmur  GI: good BS's,no masses, HSM or tenderness  :introitus is normal,scant discharge,cervix is pink,no adnexal masses or tenderness, PAP was done     MUSCULOSKELETAL: back is not tender,FROM of the back  EXTREMITIES: no cyanosis,  clubbing or edema  NEURO: Oriented times three      ASSESSMENT AND PLAN:   Ira Silva is a 45 year old female who presents for an annual/pap.   . Self breast exam explained. Health maintenance. Body mass index is 36.34 kg/m²., recommended low fat diet and aerobic exercise 30 minutes three times weekly.  The patient indicates understanding of these issues and agrees to the plan.  The patient is asked to return for an annual visit.           [1]   Current Outpatient Medications   Medication Sig Dispense Refill    acetaminophen 500 MG Oral Tab Take 1 tablet (500 mg total) by mouth every 4 (four) hours as needed for Fever (equal to or greater than 100.4). 1 tablet 1    hydroxychloroquine 200 MG Oral Tab Take 1 tablet (200 mg total) by mouth daily.      Insulin Degludec (TRESIBA FLEXTOUCH) 100 UNIT/ML Subcutaneous Solution Pen-injector Inject 28 Units into the skin daily.      Continuous Blood Gluc Sensor (DEXCOM G6 SENSOR) Does not apply Misc 1 Application by Does not apply route.      NOVOLOG 100 UNIT/ML Subcutaneous Solution      [2]   Past Medical History:   Diabetes mellitus (HCC)    IDDM (insulin dependent diabetes mellitus)    RA (rheumatoid arthritis) (HCC)    Type 1 diabetes mellitus (HCC)   [3]   Past Surgical History:  Procedure Laterality Date    Anesthesia for;  procedures        10/2012    Hysteroscopy, sterilization      ESSURE   [4]   Family History  Problem Relation Age of Onset    High Blood Pressure Father     Blood Disorder Mother         ITP    High Blood Pressure Mother    [5]   Social History  Socioeconomic History    Marital status:     Number of children: 2   Occupational History    Occupation: HR   Tobacco Use    Smoking status: Never     Passive exposure: Never    Smokeless tobacco: Never   Vaping Use    Vaping status: Never Used   Substance and Sexual Activity    Alcohol use: Yes     Comment: social    Drug use: No    Sexual activity: Yes     Partners: Male      Comment: ESSURE DONE 2013   Other Topics Concern    Blood Transfusions Yes     Comment: WITH  10/2012   Social History Narrative    NO HISTORY OF DOMESTIC AND SEXUAL ABUSE      Social Drivers of Health     Food Insecurity: No Food Insecurity (3/11/2025)    Received from [facility]    Food Insecurity     Currently or in the past 3 months, have you worried your food would run out before you had money to buy more?: No     In the past 12 months, have you run out of food or been unable to get more?: No   Transportation Needs: No Transportation Needs (3/11/2025)    Received from [facility]    Transportation Needs     Currently or in the past 3 months, has lack of transportation kept you from medical appointments, getting food or medicine, or providing care to a family member?: No   Housing Stability: Patient Declined (2024)    Received from [facility]    Housing Stability Vital Sign     Unable to Pay for Housing in the Last Year: Patient declined     Unstable Housing in the Last Year: Patient declined

## 2025-05-02 LAB
C TRACH DNA SPEC QL NAA+PROBE: NEGATIVE
HPV E6+E7 MRNA CVX QL NAA+PROBE: NEGATIVE
N GONORRHOEA DNA SPEC QL NAA+PROBE: NEGATIVE

## 2025-06-18 ENCOUNTER — OFFICE VISIT (OUTPATIENT)
Dept: PODIATRY CLINIC | Facility: CLINIC | Age: 45
End: 2025-06-18
Payer: COMMERCIAL

## 2025-06-18 DIAGNOSIS — M79.2 NEURITIS: ICD-10-CM

## 2025-06-18 DIAGNOSIS — E11.42 DIABETIC POLYNEUROPATHY ASSOCIATED WITH TYPE 2 DIABETES MELLITUS (HCC): ICD-10-CM

## 2025-06-18 DIAGNOSIS — M79.9 SOFT TISSUE LESION OF FOOT: Primary | ICD-10-CM

## 2025-06-18 PROCEDURE — 99204 OFFICE O/P NEW MOD 45 MIN: CPT | Performed by: PODIATRIST

## 2025-06-18 NOTE — PROGRESS NOTES
Reason for Visit      Ira Silva is a 45 year old female presents today complaining of bilateral foot pathologies.     History of Present Illness     Patient presents to clinic today complaining of bilateral burning tingling and soft tissue masses on the dorsal aspect of her bilateral fifth digits.  Patient is a non-insulin-dependent diabetic type II whose last A1c was 10.9 on 3/11/2025.  Patient presents to clinic today with concerns for numbness tingling burning to bilateral lower extremities as well as itching as well as soft tissue masses on the dorsal aspect of the digits bilateral.    The following portions of the patient's history were reviewed and updated as appropriate: allergies, current medications, past family history, past medical history, past social history, past surgical history and problem list.    Allergies[1]    Medications - Current[2]    There are no discontinued medications.    Problem List[3]    Past Medical History[4]    Past Surgical History[5]    Family History[6]    Social History     Occupational History    Occupation: HR   Tobacco Use    Smoking status: Never     Passive exposure: Never    Smokeless tobacco: Never   Vaping Use    Vaping status: Never Used   Substance and Sexual Activity    Alcohol use: Yes     Comment: social    Drug use: No    Sexual activity: Yes     Partners: Male     Comment: ESSURE DONE 05/2013       ROS      Constitutional: negative for chills, fevers and sweats  Gastrointestinal: negative for abdominal pain, diarrhea, nausea and vomiting  Genitourinary:negative for dysuria and hematuria  Musculoskeletal:negative for arthralgias and muscle weakness  Neurological: negative for paresthesia and weakness  All others reviewed and negative.      Physical Exam     LE PHYSICAL EXAM    Constitution: Well-developed and well-nourished. Gait appears normal. No apparent distress. Alert and oriented to person, place, and time.  Integument: There are no varicosities. Skin  appears moist, warm, and supple with positive hair growth. There are no color changes. No open lesions. No macerations, No Hyperkeratotic lesions.  Vascular examination: Dorsalis pedis and posterior tibial pulses are strong bilaterally with capillary filling time less than 3 seconds to all digits. There is no peripheral edema..  Neurological Sensorium: Grossly intact to sharp/dull. Vibratory: Intact.  Musculoskeletal:   5/5 pedal muscle strength b/l     Soft tissue masses on the dorsal aspect of IPJs bilateral.  Patient able to feel [place] 5 out of 5 bilateral lower extremities.  No open lesions noted no signs of infection noted.  Vitals: LMP 01/23/2025 (Exact Date)       Assessment and Plan     Encounter Diagnoses   Name Primary?    Soft tissue lesion of foot Yes    Neuritis     Diabetic polyneuropathy associated with type 2 diabetes mellitus (HCC)    Diabetic education and instructions have been provided. We reviewed and discussed the following:    -risk categories related to pts with diabetes and foot or lower extremity complications per ADA.    -adherence to medication regimen and close monitoring or blood sugar control.   -daily monitoring/inspection of feet and shoes.   -proper management of diet and weight   -regular follow up with PCP and specialty providers as recommended   -Lower extremity complications related to DM were reviewed and stressed prevention.         - Placed an order for an EMG to assess bilateral lower extremity neuropathy.  -Placed an order for ultrasounds of bilateral fifth digits to assess for mucoid cyst.  Discussed hammertoe syndrome and flexor tenotomy of her fifth digits if needed.      Patient was instructed to call the office or on-call podiatric physician immediately with any issues or concerns before the next scheduled visit. Patient to follow-up in clinic in after ultrasound and EMG      Jyoti Dwyer DPM, MANUEL.ROLANDO, FACMAGDA  Diplomat, American Board of Foot and Ankle  Surgery  Certified in Foot and Rearfoot/Ankle Reconstruction  Fellow of the American College of Foot and Ankle Surgeons  Fellowship Trained Foot and Ankle Surgeon   [facility]     2025    12:40 PM       [1] No Known Allergies  [2]   Current Outpatient Medications:     Phentermine HCl 30 MG Oral Cap, Take 1 capsule (30 mg total) by mouth daily., Disp: , Rfl:     topiramate 25 MG Oral Tab, Take 1 tablet (25 mg total) by mouth daily., Disp: , Rfl:     hydroxychloroquine 200 MG Oral Tab, Take 1 tablet (200 mg total) by mouth daily., Disp: , Rfl:     Insulin Degludec (TRESIBA FLEXTOUCH) 100 UNIT/ML Subcutaneous Solution Pen-injector, Inject 28 Units into the skin daily., Disp: , Rfl:     Continuous Blood Gluc Sensor (DEXCOM G6 SENSOR) Does not apply Misc, 1 Application by Does not apply route., Disp: , Rfl:     NOVOLOG 100 UNIT/ML Subcutaneous Solution, , Disp: , Rfl:   [3]   Patient Active Problem List  Diagnosis    Dysuria    Vaginal discharge    Rheumatoid aortitis    Urinary frequency    Vaginal itching    Vaginitis and vulvovaginitis    Genital herpes    Diabetes (HCC)    Cholelithiasis    Pyelonephritis History of    Essure sterilization    Recurrent vaginitis    Hyperlipidemia    Medical non-compliance    Nontoxic uninodular goiter    Other and unspecified nonspecific immunological findings    Insulin pump titration    Type 1 diabetes mellitus with ketoacidosis without coma (HCC)    Nausea and vomiting in adult    Acute kidney injury    Metabolic acidosis   [4]   Past Medical History:   Diabetes mellitus (HCC)    IDDM (insulin dependent diabetes mellitus)    RA (rheumatoid arthritis) (HCC)    Type 1 diabetes mellitus (HCC)   [5]   Past Surgical History:  Procedure Laterality Date    Anesthesia for;  procedures        10/2012    Hysteroscopy, sterilization      ESSURE   [6]   Family History  Problem Relation Age of Onset    High Blood Pressure Father     Blood Disorder  Mother         ITP    High Blood Pressure Mother

## (undated) NOTE — LETTER
Date & Time: 9/8/2023, 6:53 AM  Patient: Maria Luisa Carbajal    Dear Maria Luisa Carbajal,    After numerous attempts, we have been unable to contact you via telephone. We are trying to reach you to review test results and treatment from your visit to the Emergency Department. Please contact the Emergency Department charge nurse at [phone].     Sincerely,    [facility]

## (undated) NOTE — MR AVS SNAPSHOT
After Visit Summary   1/27/2020    Josefina Dickerson    MRN: [id number]           Visit Information     Date & Time  1/27/2020  1:15 PM Provider  Mazin Arnold, [address], [place] Dept.  Phone  [phone]. Video Visits are available Monday - Friday for many common conditions such as allergies, colds, cough, fever, rash, sore throat, headache and pink eye.   The cost for a Video Visit is currently $35.         If you receive a survey from [facility] Saturday – Sunday  10:00 am – 4:00 pm  [facility]  Emergency Medicine Providers  Conditions needing urgent attention, but are   non-life-threatening.     Also available by appointment     Average cost  $120*     EMERGENCY ROOM         Life-threatening zita